# Patient Record
Sex: MALE | Race: WHITE | Employment: FULL TIME | URBAN - METROPOLITAN AREA
[De-identification: names, ages, dates, MRNs, and addresses within clinical notes are randomized per-mention and may not be internally consistent; named-entity substitution may affect disease eponyms.]

---

## 2023-10-03 ENCOUNTER — OFFICE VISIT (OUTPATIENT)
Dept: ORTHOPEDIC SURGERY | Age: 68
End: 2023-10-03
Payer: MEDICARE

## 2023-10-03 VITALS — HEIGHT: 70 IN | BODY MASS INDEX: 25.77 KG/M2 | WEIGHT: 180 LBS

## 2023-10-03 DIAGNOSIS — M79.604 RIGHT LEG PAIN: ICD-10-CM

## 2023-10-03 DIAGNOSIS — M54.9 BACK PAIN, UNSPECIFIED BACK LOCATION, UNSPECIFIED BACK PAIN LATERALITY, UNSPECIFIED CHRONICITY: Primary | ICD-10-CM

## 2023-10-03 DIAGNOSIS — T84.010A FAILURE OF RIGHT TOTAL HIP ARTHROPLASTY, INITIAL ENCOUNTER (HCC): ICD-10-CM

## 2023-10-03 DIAGNOSIS — M25.551 ACUTE PAIN OF RIGHT HIP: Primary | ICD-10-CM

## 2023-10-03 DIAGNOSIS — Z96.641 HISTORY OF TOTAL HIP ARTHROPLASTY, RIGHT: ICD-10-CM

## 2023-10-03 DIAGNOSIS — M54.9 BACK PAIN, UNSPECIFIED BACK LOCATION, UNSPECIFIED BACK PAIN LATERALITY, UNSPECIFIED CHRONICITY: ICD-10-CM

## 2023-10-03 LAB
BASOPHILS # BLD: 0.1 K/UL (ref 0–0.2)
BASOPHILS NFR BLD: 1 % (ref 0–2)
CRP SERPL-MCNC: <0.3 MG/DL (ref 0–0.9)
DIFFERENTIAL METHOD BLD: ABNORMAL
EOSINOPHIL # BLD: 0.2 K/UL (ref 0–0.8)
EOSINOPHIL NFR BLD: 3 % (ref 0.5–7.8)
ERYTHROCYTE [DISTWIDTH] IN BLOOD BY AUTOMATED COUNT: 13.7 % (ref 11.9–14.6)
ERYTHROCYTE [SEDIMENTATION RATE] IN BLOOD: <1 MM/HR
HCT VFR BLD AUTO: 40.4 % (ref 41.1–50.3)
HGB BLD-MCNC: 13.6 G/DL (ref 13.6–17.2)
IMM GRANULOCYTES # BLD AUTO: 0 K/UL (ref 0–0.5)
IMM GRANULOCYTES NFR BLD AUTO: 0 % (ref 0–5)
LYMPHOCYTES # BLD: 1.4 K/UL (ref 0.5–4.6)
LYMPHOCYTES NFR BLD: 18 % (ref 13–44)
MCH RBC QN AUTO: 32.2 PG (ref 26.1–32.9)
MCHC RBC AUTO-ENTMCNC: 33.7 G/DL (ref 31.4–35)
MCV RBC AUTO: 95.5 FL (ref 82–102)
MONOCYTES # BLD: 0.6 K/UL (ref 0.1–1.3)
MONOCYTES NFR BLD: 8 % (ref 4–12)
NEUTS SEG # BLD: 5.6 K/UL (ref 1.7–8.2)
NEUTS SEG NFR BLD: 70 % (ref 43–78)
NRBC # BLD: 0 K/UL (ref 0–0.2)
PLATELET # BLD AUTO: 222 K/UL (ref 150–450)
PMV BLD AUTO: 11.1 FL (ref 9.4–12.3)
RBC # BLD AUTO: 4.23 M/UL (ref 4.23–5.6)
WBC # BLD AUTO: 7.9 K/UL (ref 4.3–11.1)

## 2023-10-03 PROCEDURE — G8484 FLU IMMUNIZE NO ADMIN: HCPCS | Performed by: ORTHOPAEDIC SURGERY

## 2023-10-03 PROCEDURE — 3017F COLORECTAL CA SCREEN DOC REV: CPT | Performed by: ORTHOPAEDIC SURGERY

## 2023-10-03 PROCEDURE — 1036F TOBACCO NON-USER: CPT | Performed by: ORTHOPAEDIC SURGERY

## 2023-10-03 PROCEDURE — G8419 CALC BMI OUT NRM PARAM NOF/U: HCPCS | Performed by: ORTHOPAEDIC SURGERY

## 2023-10-03 PROCEDURE — G8428 CUR MEDS NOT DOCUMENT: HCPCS | Performed by: ORTHOPAEDIC SURGERY

## 2023-10-03 PROCEDURE — 99204 OFFICE O/P NEW MOD 45 MIN: CPT | Performed by: ORTHOPAEDIC SURGERY

## 2023-10-03 PROCEDURE — 1123F ACP DISCUSS/DSCN MKR DOCD: CPT | Performed by: ORTHOPAEDIC SURGERY

## 2023-10-03 RX ORDER — ZALEPLON 10 MG/1
10 CAPSULE ORAL
COMMUNITY
Start: 2023-08-14

## 2023-10-03 RX ORDER — MESALAMINE 4 G/60 ML
KIT RECTAL
COMMUNITY
Start: 2023-09-12

## 2023-10-03 RX ORDER — TADALAFIL 20 MG/1
20 TABLET ORAL PRN
COMMUNITY
Start: 2023-07-17

## 2023-10-03 RX ORDER — SODIUM, POTASSIUM,MAG SULFATES 17.5-3.13G
SOLUTION, RECONSTITUTED, ORAL ORAL
COMMUNITY
Start: 2023-09-07

## 2023-10-03 RX ORDER — FINASTERIDE 5 MG/1
5 TABLET, FILM COATED ORAL DAILY
COMMUNITY
Start: 2023-01-11

## 2023-10-03 RX ORDER — PYRIDOXINE HCL (VITAMIN B6) 100 MG
100 TABLET ORAL EVERY MORNING
COMMUNITY

## 2023-10-03 RX ORDER — BUDESONIDE 3 MG/1
CAPSULE, COATED PELLETS ORAL
COMMUNITY
Start: 2023-09-20 | End: 2023-11-15

## 2023-10-03 RX ORDER — ATORVASTATIN CALCIUM 10 MG/1
10 TABLET, FILM COATED ORAL
COMMUNITY
Start: 2022-11-01

## 2023-10-03 RX ORDER — PANTOPRAZOLE SODIUM 40 MG/1
40 TABLET, DELAYED RELEASE ORAL DAILY
COMMUNITY
Start: 2022-11-16

## 2023-10-03 RX ORDER — CELECOXIB 200 MG/1
200 CAPSULE ORAL DAILY
COMMUNITY
Start: 2022-11-28

## 2023-10-03 RX ORDER — HYDROCHLOROTHIAZIDE 12.5 MG/1
12.5 CAPSULE, GELATIN COATED ORAL EVERY OTHER DAY
COMMUNITY
Start: 2023-08-23

## 2023-10-03 NOTE — PROGRESS NOTES
Name: Mackenzie Earl  YOB: 1955  Gender: male  MRN: [id number]    CC:   Chief Complaint   Patient presents with    Follow-up     Rt rosalio revision hip and groin pain- Dr. Ligia Meade 2020         HPI: Mackenzie Earl is a 69 y.o. male with a PMHx of GERD, HLD, s/p right ROSALIO revision in 2020 with Dr. Ligia Meade, and history of lumbar surgery with Dr. Traci Lewis in 2013 here for evaluation of right hip pain  There was not an acute injury  Regarding the hip pain, it has been present for 2 years and is becoming worse. The pain is primarily lateral, over greater trochanter, anterior into the groin, and posterior buttock area  he describes the pain as dull, aching, throbbing, intermittent catching, and and often feels unstable  The pain is worse with going up and/or down stairs, rising after sitting, and walking  The pain does not radiate down the leg. he denies numbness and tingling down the leg. Treatment so far has been activity modification, Tylenol, and celebrex with little relief. Review of Systems  As per HPI. Pertinent positives and negatives are addressed with the patient, particularly those related to musculoskeletal concerns. Non-orthopaedic concerns were referred back to the primary care physician. Not on File                 PHYSICAL EXAMINATION:   The patient is alert and oriented, in no distress. There were no vitals filed for this visit. The cervical, thoracic and lumbar spine are without compromising deformity. The upper extremities demonstrate reasonable tone, strength and function bilaterally. The lower extremities are as described below. Circulation is normal with palpable pedal pulses bilaterally and no edema. Skin of the leg is normal with no chronic stasis disease bilaterally. Sensation is intact to light touch bilaterally.   The gait is noted to be with a slight trendelenburg and antalgia  There is mild tenderness to palpation along the spinous processes

## 2023-10-09 ENCOUNTER — TELEPHONE (OUTPATIENT)
Dept: ORTHOPEDIC SURGERY | Age: 68
End: 2023-10-09

## 2023-10-09 NOTE — TELEPHONE ENCOUNTER
Spoke with patient, will ask dr. Marek Jain if mri for hip is ok to schedule & update the patient with information

## 2023-10-11 DIAGNOSIS — M25.551 ACUTE PAIN OF RIGHT HIP: Primary | ICD-10-CM

## 2023-10-11 DIAGNOSIS — T84.010A FAILURE OF RIGHT TOTAL HIP ARTHROPLASTY, INITIAL ENCOUNTER (HCC): ICD-10-CM

## 2023-10-11 DIAGNOSIS — Z96.641 HISTORY OF TOTAL HIP ARTHROPLASTY, RIGHT: ICD-10-CM

## 2023-10-13 DIAGNOSIS — T84.010A FAILURE OF RIGHT TOTAL HIP ARTHROPLASTY, INITIAL ENCOUNTER (HCC): ICD-10-CM

## 2023-10-13 DIAGNOSIS — M25.551 ACUTE PAIN OF RIGHT HIP: Primary | ICD-10-CM

## 2023-10-13 DIAGNOSIS — Z96.641 HISTORY OF TOTAL HIP ARTHROPLASTY, RIGHT: ICD-10-CM

## 2023-10-13 DIAGNOSIS — Z96.641 HX OF TOTAL HIP ARTHROPLASTY, RIGHT: ICD-10-CM

## 2023-10-17 ENCOUNTER — OFFICE VISIT (OUTPATIENT)
Dept: ORTHOPEDIC SURGERY | Age: 68
End: 2023-10-17

## 2023-10-17 DIAGNOSIS — M25.551 ACUTE PAIN OF RIGHT HIP: Primary | ICD-10-CM

## 2023-10-17 NOTE — PROGRESS NOTES
[facility]  Pre Operative History and Physical Exam    Patient ID:  Miguel Huerta  [id number]  89 y.o.  1955    Today: October 17, 2023           CC:  Right hip pain    HPI:   The patient has a failed SUNNY right hip. The patient was evaluated and examined during a consultation prior to this office visit. There have been no changes to the patient's orthopedic condition since the initial consultation. The patient has failed previous conservative treatment for this condition including antiinflammatories , and lifestyle modifications. The necessity for joint replacement is present. The patient will be admitted the day of surgery for right hip replacement    Past Medical/Surgical History:No past medical history on file. No past surgical history on file.      Allergies: No Known Allergies     Physical Exam:   General: NAD, Alert, Oriented, Appears their stated age     HEENT: NC/AT    Skin: No rashes, lesions or wounds seen      Psych: normal affect      Heart: Regular Rate, Rhythm     Lungs: unlabored respirations, no wheezing    Abdomen: Soft and non-distended     Ortho: Pain with limited ROM of the right hip    Neuro: no focal defects, moving extremities equally    Lymph: no lymphadenopathy     Meds:   Current Outpatient Medications   Medication Sig    atorvastatin (LIPITOR) 10 MG tablet Take 1 tablet by mouth    budesonide (ENTOCORT EC) 3 MG delayed release capsule Take by mouth    celecoxib (CELEBREX) 200 MG capsule Take 1 capsule by mouth daily    diclofenac (VOLTAREN) 50 MG EC tablet TAKE 1 TABLET (50 MG) BY MOUTH 2 (TWO) TIMES A DAY    finasteride (PROSCAR) 5 MG tablet Take 1 tablet by mouth daily    hydroCHLOROthiazide (MICROZIDE) 12.5 MG capsule Take 1 capsule by mouth every other day    Mesalamine-Cleanser 4 g KIT INSERT 60 ML INTO THE RECTUM DAILY    sodium-potassium-mag sulfate (SUPREP) 17.5-3.13-1.6 GM/177ML SOLN solution As directed for colon prep    pantoprazole (PROTONIX)
into each neural foramen, contributing  to severe bilateral neural foraminal narrowing. L5-S1: Bilateral facet arthropathy. Shallow circumferential disc bulge/posterior  disc osteophyte complex. No spinal canal stenosis. Disc osteophyte complex  extends into each neural foramen, contributing to moderate left and no  significant right neural foraminal narrowing. IMPRESSION:     1.  Multilevel lumbar spondylosis, as detailed above. 2.  At L1-L2, there is moderate to severe left neural foraminal narrowing. 3.  At L2-L3, there is mild spinal canal stenosis with mild bilateral neural  foraminal narrowing. 4.  At L3-L4, there is mild spinal canal stenosis with severe right and  mild-to-moderate left neural foraminal narrowing. 5. At L4-L5, there is mild spinal canal stenosis with severe bilateral neural  foraminal narrowing. 6.  At L5-S1, there is moderate left neural foraminal narrowing. IMPRESSION:      ICD-10-CM    1. Acute pain of right hip  M25.551               RECOMMENDATION:    X-rays were reviewed with the patient today. The patient is complaining of pain and discomfort in his right buttock and right hip area. It is made worse with standing walking. Some of his symptoms sound as if they could be related to spinal stenosis. Although he has symptoms which are probably related to the right hip. 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 He had a constrained liner placed and it appears that he has failed the constrained liner with dissociation of the locking ring. I am also somewhat concerned about the possibility of plastic failure within this right hip that could result in metal or metal wear. I have recommended that he have a CBC and sed rate to rule out infection. I recommended an MRI scan of the lumbar spine to evaluate for spinal stenosis. I have recommended revision of his right total hip arthroplasty.   I have

## 2023-10-25 DIAGNOSIS — Z96.641 STATUS POST RIGHT HIP REPLACEMENT: Primary | ICD-10-CM

## 2023-10-27 ENCOUNTER — PREP FOR PROCEDURE (OUTPATIENT)
Dept: ORTHOPEDIC SURGERY | Age: 68
End: 2023-10-27

## 2023-10-27 DIAGNOSIS — T84.010A FAILURE OF RIGHT TOTAL HIP ARTHROPLASTY, INITIAL ENCOUNTER (HCC): Primary | ICD-10-CM

## 2023-10-27 RX ORDER — SODIUM CHLORIDE 9 MG/ML
INJECTION, SOLUTION INTRAVENOUS PRN
Status: CANCELLED | OUTPATIENT
Start: 2023-10-27

## 2023-10-27 RX ORDER — ACETAMINOPHEN 325 MG/1
1000 TABLET ORAL ONCE
Status: CANCELLED | OUTPATIENT
Start: 2023-10-27 | End: 2023-10-27

## 2023-10-27 RX ORDER — SODIUM CHLORIDE 0.9 % (FLUSH) 0.9 %
5-40 SYRINGE (ML) INJECTION EVERY 12 HOURS SCHEDULED
Status: CANCELLED | OUTPATIENT
Start: 2023-10-27

## 2023-10-27 RX ORDER — SODIUM CHLORIDE 0.9 % (FLUSH) 0.9 %
5-40 SYRINGE (ML) INJECTION PRN
Status: CANCELLED | OUTPATIENT
Start: 2023-10-27

## 2023-10-30 ENCOUNTER — HOSPITAL ENCOUNTER (OUTPATIENT)
Dept: SURGERY | Age: 68
Discharge: HOME | End: 2023-11-02
Payer: MEDICARE

## 2023-10-30 ENCOUNTER — HOSPITAL ENCOUNTER (OUTPATIENT)
Dept: REHABILITATION | Age: 68
Discharge: HOME | End: 2023-11-02
Payer: MEDICARE

## 2023-10-30 VITALS
RESPIRATION RATE: 18 BRPM | SYSTOLIC BLOOD PRESSURE: 138 MMHG | DIASTOLIC BLOOD PRESSURE: 71 MMHG | HEART RATE: 58 BPM | TEMPERATURE: 98.2 F | OXYGEN SATURATION: 94 % | WEIGHT: 187.39 LBS | BODY MASS INDEX: 26.83 KG/M2 | HEIGHT: 70 IN

## 2023-10-30 DIAGNOSIS — T84.010A FAILURE OF RIGHT TOTAL HIP ARTHROPLASTY, INITIAL ENCOUNTER (HCC): ICD-10-CM

## 2023-10-30 LAB
ANION GAP SERPL CALC-SCNC: 5 MMOL/L (ref 2–11)
APTT PPP: 26.2 SEC (ref 24.5–34.2)
BASOPHILS # BLD: 0 K/UL (ref 0–0.2)
BASOPHILS NFR BLD: 1 % (ref 0–2)
BUN SERPL-MCNC: 23 MG/DL (ref 8–23)
CALCIUM SERPL-MCNC: 9.3 MG/DL (ref 8.3–10.4)
CHLORIDE SERPL-SCNC: 108 MMOL/L (ref 101–110)
CO2 SERPL-SCNC: 27 MMOL/L (ref 21–32)
CREAT SERPL-MCNC: 1.01 MG/DL (ref 0.8–1.5)
DIFFERENTIAL METHOD BLD: ABNORMAL
EKG ATRIAL RATE: 54 BPM
EKG DIAGNOSIS: NORMAL
EKG P AXIS: 75 DEGREES
EKG P-R INTERVAL: 174 MS
EKG Q-T INTERVAL: 410 MS
EKG QRS DURATION: 94 MS
EKG QTC CALCULATION (BAZETT): 388 MS
EKG R AXIS: 72 DEGREES
EKG T AXIS: 33 DEGREES
EKG VENTRICULAR RATE: 54 BPM
EOSINOPHIL # BLD: 0.2 K/UL (ref 0–0.8)
EOSINOPHIL NFR BLD: 4 % (ref 0.5–7.8)
ERYTHROCYTE [DISTWIDTH] IN BLOOD BY AUTOMATED COUNT: 13.2 % (ref 11.9–14.6)
EST. AVERAGE GLUCOSE BLD GHB EST-MCNC: 114 MG/DL
GLUCOSE SERPL-MCNC: 165 MG/DL (ref 65–100)
HBA1C MFR BLD: 5.6 % (ref 4.8–5.6)
HCT VFR BLD AUTO: 39.4 % (ref 41.1–50.3)
HGB BLD-MCNC: 13.3 G/DL (ref 13.6–17.2)
IMM GRANULOCYTES # BLD AUTO: 0 K/UL (ref 0–0.5)
IMM GRANULOCYTES NFR BLD AUTO: 0 % (ref 0–5)
INR PPP: 1
LYMPHOCYTES # BLD: 1.5 K/UL (ref 0.5–4.6)
LYMPHOCYTES NFR BLD: 25 % (ref 13–44)
MCH RBC QN AUTO: 31.7 PG (ref 26.1–32.9)
MCHC RBC AUTO-ENTMCNC: 33.8 G/DL (ref 31.4–35)
MCV RBC AUTO: 93.8 FL (ref 82–102)
MONOCYTES # BLD: 0.4 K/UL (ref 0.1–1.3)
MONOCYTES NFR BLD: 7 % (ref 4–12)
MRSA DNA SPEC QL NAA+PROBE: NOT DETECTED
NEUTS SEG # BLD: 4 K/UL (ref 1.7–8.2)
NEUTS SEG NFR BLD: 64 % (ref 43–78)
NRBC # BLD: 0 K/UL (ref 0–0.2)
PLATELET # BLD AUTO: 201 K/UL (ref 150–450)
PMV BLD AUTO: 10.9 FL (ref 9.4–12.3)
POTASSIUM SERPL-SCNC: 4.1 MMOL/L (ref 3.5–5.1)
PROTHROMBIN TIME: 13.4 SEC (ref 12.6–14.3)
RBC # BLD AUTO: 4.2 M/UL (ref 4.23–5.6)
S AUREUS CPE NOSE QL NAA+PROBE: DETECTED
SODIUM SERPL-SCNC: 140 MMOL/L (ref 133–143)
WBC # BLD AUTO: 6.2 K/UL (ref 4.3–11.1)

## 2023-10-30 PROCEDURE — 80048 BASIC METABOLIC PNL TOTAL CA: CPT

## 2023-10-30 PROCEDURE — 93005 ELECTROCARDIOGRAM TRACING: CPT | Performed by: ANESTHESIOLOGY

## 2023-10-30 PROCEDURE — 87641 MR-STAPH DNA AMP PROBE: CPT

## 2023-10-30 PROCEDURE — 94760 N-INVAS EAR/PLS OXIMETRY 1: CPT

## 2023-10-30 PROCEDURE — 85730 THROMBOPLASTIN TIME PARTIAL: CPT

## 2023-10-30 PROCEDURE — 93010 ELECTROCARDIOGRAM REPORT: CPT | Performed by: INTERNAL MEDICINE

## 2023-10-30 PROCEDURE — 85025 COMPLETE CBC W/AUTO DIFF WBC: CPT

## 2023-10-30 PROCEDURE — 83036 HEMOGLOBIN GLYCOSYLATED A1C: CPT

## 2023-10-30 PROCEDURE — 97161 PT EVAL LOW COMPLEX 20 MIN: CPT

## 2023-10-30 PROCEDURE — 85610 PROTHROMBIN TIME: CPT

## 2023-10-30 RX ORDER — NAPROXEN 500 MG/1
500 TABLET ORAL 2 TIMES DAILY WITH MEALS
COMMUNITY

## 2023-10-30 RX ORDER — ALBUTEROL SULFATE 2.5 MG/3ML
2.5 SOLUTION RESPIRATORY (INHALATION) EVERY 6 HOURS PRN
Status: CANCELLED | OUTPATIENT
Start: 2023-10-30

## 2023-10-30 ASSESSMENT — HOOS JR
SITTING: 1
BENDING TO THE FLOOR TO PICK UP OBJECT: 1
HOOS JR RAW SCORE: 5
GOING UP OR DOWN STAIRS: 1
HOOS JR RAW SCORE: 5
WALKING ON UNEVEN SURFACE: 1
HOOS JR TOTAL INTERVAL SCORE: 73.472
RISING FROM SITTING: 0
LYING IN BED (TURNING OVER, MAINTAINING HIP POSITION): 1

## 2023-10-30 ASSESSMENT — PAIN DESCRIPTION - DESCRIPTORS
DESCRIPTORS: SHARP;SHOOTING
DESCRIPTORS: ACHING

## 2023-10-30 ASSESSMENT — PROMIS GLOBAL HEALTH SCALE
IN THE PAST 7 DAYS, HOW OFTEN HAVE YOU BEEN BOTHERED BY EMOTIONAL PROBLEMS, SUCH AS FEELING ANXIOUS, DEPRESSED, OR IRRITABLE [ON A SCALE FROM 1 (NEVER) TO 5 (ALWAYS)]?: 5
SUM OF RESPONSES TO QUESTIONS 2, 4, 5, & 10: 20
HOW IS THE PROMIS V1.1 BEING ADMINISTERED?: 0
IN GENERAL, PLEASE RATE HOW WELL YOU CARRY OUT YOUR USUAL SOCIAL ACTIVITIES (INCLUDES ACTIVITIES AT HOME, AT WORK, AND IN YOUR COMMUNITY, AND RESPONSIBILITIES AS A PARENT, CHILD, SPOUSE, EMPLOYEE, FRIEND, ETC) [ON A SCALE OF 1 (POOR) TO 5 (EXCELLENT)]?: 5
IN THE PAST 7 DAYS, HOW WOULD YOU RATE YOUR PAIN ON AVERAGE [ON A SCALE FROM 0 (NO PAIN) TO 10 (WORST IMAGINABLE PAIN)]?: 2
IN GENERAL, HOW WOULD YOU RATE YOUR SATISFACTION WITH YOUR SOCIAL ACTIVITIES AND RELATIONSHIPS [ON A SCALE OF 1 (POOR) TO 5 (EXCELLENT)]?: 5
IN THE PAST 7 DAYS, HOW WOULD YOU RATE YOUR FATIGUE ON AVERAGE [ON A SCALE FROM 1 (NONE) TO 5 (VERY SEVERE)]?: 4
TO WHAT EXTENT ARE YOU ABLE TO CARRY OUT YOUR EVERYDAY PHYSICAL ACTIVITIES SUCH AS WALKING, CLIMBING STAIRS, CARRYING GROCERIES, OR MOVING A CHAIR [ON A SCALE OF 1 (NOT AT ALL) TO 5 (COMPLETELY)]?: 3
IN GENERAL, WOULD YOU SAY YOUR QUALITY OF LIFE IS...[ON A SCALE OF 1 (POOR) TO 5 (EXCELLENT)]: 5
SUM OF RESPONSES TO QUESTIONS 3, 6, 7, & 8: 14
IN GENERAL, HOW WOULD YOU RATE YOUR MENTAL HEALTH, INCLUDING YOUR MOOD AND YOUR ABILITY TO THINK [ON A SCALE OF 1 (POOR) TO 5 (EXCELLENT)]?: 5
WHO IS THE PERSON COMPLETING THE PROMIS V1.1 SURVEY?: 0
IN GENERAL, HOW WOULD YOU RATE YOUR PHYSICAL HEALTH [ON A SCALE OF 1 (POOR) TO 5 (EXCELLENT)]?: 5
IN GENERAL, WOULD YOU SAY YOUR HEALTH IS...[ON A SCALE OF 1 (POOR) TO 5 (EXCELLENT)]: 5

## 2023-10-30 ASSESSMENT — PULMONARY FUNCTION TESTS
FEV1 (LITERS): 3.07
FEV1 (%PREDICTED): 98

## 2023-10-30 ASSESSMENT — PAIN SCALES - GENERAL: PAINLEVEL_OUTOF10: 3

## 2023-10-30 ASSESSMENT — PAIN DESCRIPTION - FREQUENCY: FREQUENCY: CONTINUOUS

## 2023-10-30 ASSESSMENT — PAIN DESCRIPTION - ORIENTATION: ORIENTATION: RIGHT

## 2023-10-30 ASSESSMENT — PAIN - FUNCTIONAL ASSESSMENT: PAIN_FUNCTIONAL_ASSESSMENT: PREVENTS OR INTERFERES SOME ACTIVE ACTIVITIES AND ADLS

## 2023-10-30 ASSESSMENT — PAIN DESCRIPTION - LOCATION
LOCATION: HIP
LOCATION: HIP

## 2023-10-30 NOTE — PERIOP NOTE
Lab results within anesthesia guidelines, no follow-up required. Labs automatically routed to ordering provider via Epic documentation.    Latest Reference Range & Units 10/30/23 07:56   Sodium 133 - 143 mmol/L 140   Potassium 3.5 - 5.1 mmol/L 4.1   Chloride 101 - 110 mmol/L 108   CO2 21 - 32 mmol/L 27   BUN,BUNPL 8 - 23 MG/DL 23   Creatinine 0.8 - 1.5 MG/DL 1.01   Anion Gap 2 - 11 mmol/L 5   Est, Glom Filt Rate >60 ml/min/1.73m2 >60   Glucose, Random 65 - 100 mg/dL 165 (H)   CALCIUM, SERUM, 960646 8.3 - 10.4 MG/DL 9.3   Hemoglobin A1C 4.8 - 5.6 % 5.6   eAG (mg/dL) mg/dL 114   WBC 4.3 - 11.1 K/uL 6.2   RBC 4.23 - 5.6 M/uL 4.20 (L)   Hemoglobin Quant 13.6 - 17.2 g/dL 13.3 (L)   Hematocrit 41.1 - 50.3 % 39.4 (L)   MCV 82.0 - 102.0 FL 93.8   MCH 26.1 - 32.9 PG 31.7   MCHC 31.4 - 35.0 g/dL 33.8   MPV 9.4 - 12.3 FL 10.9   RDW 11.9 - 14.6 % 13.2   Platelet Count 799 - 450 K/uL 201   Neutrophils % 43 - 78 % 64   Lymphocyte % 13 - 44 % 25   Monocytes % 4.0 - 12.0 % 7   Eosinophils % 0.5 - 7.8 % 4   Basophils % 0.0 - 2.0 % 1   Neutrophils Absolute 1.7 - 8.2 K/UL 4.0   Lymphocytes Absolute 0.5 - 4.6 K/UL 1.5   Monocytes Absolute 0.1 - 1.3 K/UL 0.4   Eosinophils Absolute 0.0 - 0.8 K/UL 0.2   Basophils Absolute 0.0 - 0.2 K/UL 0.0   Differential Type -   AUTOMATED   Immature Granulocytes 0.0 - 5.0 % 0   Nucleated Red Blood Cells 0.0 - 0.2 K/uL 0.00   Absolute Immature Granulocyte 0.0 - 0.5 K/UL 0.0   Prothrombin Time 12.6 - 14.3 sec 13.4   INR -   1.0   PTT 24.5 - 34.2 SEC 26.2   MRSA/STAPH AUREUS DNA  Rpt   (H): Data is abnormally high  (L): Data is abnormally low  Rpt: View report in Results Review for more information

## 2023-10-30 NOTE — PROGRESS NOTES
10/30/23 0715   Treatment   Treatment Type Bedside spirometry   Oxygen Therapy/Pulse Ox   O2 Therapy Room air   Pulse 54   SpO2 96 %   Pulse Oximeter Device Mode Intermittent   $Pulse Oximeter $Spot check (single)   Bedside Spirometry   FEV-1/Actual (Liters) 3.07 Liters   FEV-1/Predicted (Liters) 98 Liters     Initial respiratory Assessment completed with pt. Pt was interviewed and evaluated in Joint camp prior to surgery. Patient ID:  Kiana Balderas  [id number]  66 y.o.  1955  Surgeon: Dr. Marly Valentine  Date of Surgery: [email or web address]  Procedure: Total Right Hip Arthroplasty  Primary Care Physician: Adalberto Acuna -[phone]  Specialists:     BS:clear      Pt taught proper COUGH technique  IS REVIEWED WITH PT AS WELL AS BENEFITS OF USING IS IN SEDENTARY PTS.   DIAPHRAGMATIC BREATHING EXERCISE INSTRUCTIONS GIVEN    History of smoking:   DENIES                 Quit date:         Secondhand smoke:parents    Past procedures with Oxygen desaturation or delayed awakening:DENIES    Past Medical History:   Diagnosis Date    Arthritis     Benign essential hypertension     Collagenous colitis 09/2023    found in colon biopsy, treated with Budesonide    DDD (degenerative disc disease), lumbar     Diabetes mellitus ([address])     cntrolled with diet    DVT (deep venous thrombosis) ([address]) 02/14/2014    left leg after hamstring repair    GERD (gastroesophageal reflux disease)     Nuclear sclerotic cataract, bilateral     PVC (premature ventricular contraction) 10/30/2023    noted on EKG    Reflux gastritis     managed with medication    HX OF MILD COVID  RADHA- NO CPAP    Respiratory history:DENIES SOB                                                                  Respiratory meds:  DENIES    FAMILY PRESENT:             NO     PAST SLEEP STUDY:        YES                     HX OF RADHA:                        YES                     RADHA assessment:     DANGERS OF UNTREATED RADHA EXPLAINED TO PT.
10/30/23 0715   Treatment   Treatment Type Bedside spirometry   Oxygen Therapy/Pulse Ox   O2 Therapy Room air   Pulse 54   SpO2 96 %   Pulse Oximeter Device Mode Intermittent   $Pulse Oximeter $Spot check (single)   Bedside Spirometry   FEV-1/Actual (Liters) 3.07 Liters   FEV-1/Predicted (Liters) 98 Liters     Initial respiratory Assessment completed with pt. Pt was interviewed and evaluated in Joint camp prior to surgery. Patient ID:  Nick De La Cruz  [id number]  77 y.o.  1955  Surgeon: {[facility] ORTHO SURGEONS - GGL:[id number]}  Date of Surgery: [unfilled]  Procedure: Total {COLLIN ORTHO EXTREMITY:[id number]} Arthroplasty  Primary Care Physician: Cullen Taavres [phone]  Specialists:     BS:      Pt taught proper COUGH technique  IS REVIEWED WITH PT AS WELL AS BENEFITS OF USING IS IN SEDENTARY PTS.   DIAPHRAGMATIC BREATHING EXERCISE INSTRUCTIONS GIVEN    History of smoking:   DENIES                 Quit date:         Secondhand smoke:DENIES    Past procedures with Oxygen desaturation or delayed awakening:DENIES    Past Medical History:   Diagnosis Date    Arthritis     Benign essential hypertension     Collagenous colitis 09/2023    found in colon biopsy, treated with Budesonide    DDD (degenerative disc disease), lumbar     Diabetes mellitus ([address])     cntrolled with diet    DVT (deep venous thrombosis) ([address]) 02/14/2014    left leg after hamstring repair    GERD (gastroesophageal reflux disease)     Nuclear sclerotic cataract, bilateral     PVC (premature ventricular contraction) 10/30/2023    noted on EKG    Reflux gastritis     managed with medication        Respiratory history:DENIES SOB                                 SOB  ON EXERTION                                    Respiratory meds:  DENIES    FAMILY PRESENT:             NO     PAST SLEEP STUDY:        YES                   DENIES  HX OF RADHA:                        YES                    DENIES  RADHA assessment:     DANGERS OF UNTREATED
Encounter Date: 10/30/23   EKG 12 Lead   Result Value    Ventricular Rate 54    Atrial Rate 54    P-R Interval 174    QRS Duration 94    Q-T Interval 410    QTc Calculation (Bazett) 388    P Axis 75    R Axis 72    T Axis 33    Diagnosis      Sinus bradycardia with occasional Premature ventricular complexes  Otherwise normal ECG  No previous ECGs available  Confirmed by ST MARIA PATTERSON MD ()NOEL ([id number]) on 10/30/2023 9:08:53 AM         Data Review:   Labs:   Recent Results (from the past 24 hour(s))   EKG 12 Lead    Collection Time: 10/30/23  7:31 AM   Result Value Ref Range    Ventricular Rate 54 BPM    Atrial Rate 54 BPM    P-R Interval 174 ms    QRS Duration 94 ms    Q-T Interval 410 ms    QTc Calculation (Bazett) 388 ms    P Axis 75 degrees    R Axis 72 degrees    T Axis 33 degrees    Diagnosis       Sinus bradycardia with occasional Premature ventricular complexes  Otherwise normal ECG  No previous ECGs available  Confirmed by ST MARIA PATTERSON MD ()NOEL ([id number]) on 10/30/2023 9:08:53 AM     Protime-INR    Collection Time: 10/30/23  7:56 AM   Result Value Ref Range    Protime 13.4 12.6 - 14.3 sec    INR 1.0     Hemoglobin A1c    Collection Time: 10/30/23  7:56 AM   Result Value Ref Range    Hemoglobin A1C 5.6 4.8 - 5.6 %    eAG 114 mg/dL   CBC with Auto Differential    Collection Time: 10/30/23  7:56 AM   Result Value Ref Range    WBC 6.2 4.3 - 11.1 K/uL    RBC 4.20 (L) 4.23 - 5.6 M/uL    Hemoglobin 13.3 (L) 13.6 - 17.2 g/dL    Hematocrit 39.4 (L) 41.1 - 50.3 %    MCV 93.8 82.0 - 102.0 FL    MCH 31.7 26.1 - 32.9 PG    MCHC 33.8 31.4 - 35.0 g/dL    RDW 13.2 11.9 - 14.6 %    Platelets 904 493 - 335 K/uL    MPV 10.9 9.4 - 12.3 FL    nRBC 0.00 0.0 - 0.2 K/uL    Differential Type AUTOMATED      Neutrophils % 64 43 - 78 %    Lymphocytes % 25 13 - 44 %    Monocytes % 7 4.0 - 12.0 %    Eosinophils % 4 0.5 - 7.8 %    Basophils % 1 0.0 - 2.0 %    Immature Granulocytes 0 0.0 - 5.0 %    Neutrophils Absolute 4.0 1.7 -

## 2023-10-30 NOTE — PERIOP NOTE
Patient verified name and . Order for consent was found in EHR and matches case posting; patient verified.   : Revision Right total hip arthroplasty    Type 3 surgery, PAT joint camp assessment complete. Labs per surgeon: CBC,BMP, PT/PTT, HgbA1c ; results pending  Labs per anesthesia protocol: no additional  EKG: Done today and within anesthesia guidelines    Latest Exercise stress test 10/24/2018 and latest ECHO with LVEF 55-65% 10/24/2018 and found in [facility] for anesthesia reference. MRSA/MSSA swab collected; pharmacy to review and dose antibiotic as appropriate. Hospital approved surgical skin cleanser and instructions to return bottle on DOS given per hospital policy. Patient provided with handouts including Guide to Surgery, Pain Management, Hand Hygiene, Blood Transfusion Education, and [facility] Brochure. Patient answered medical/surgical history questions at their best of ability. All prior to admission medications documented in [facility]. Original medication prescription bottle not visualized during patient appointment. Patient instructed to hold all vitamins 3 weeks prior to surgery and NSAIDS 5 days prior to surgery. Patient teach back successful and patient demonstrates knowledge of instruction.

## 2023-10-30 NOTE — PROGRESS NOTES
Tereso Reasons  : 1955  Primary: Medicare Part [place] B  Secondary: [id number] [address], [address]  [phone]      Physical Therapy Prehab Evaluation Summary:10/30/2023   Time In/Out   PT Charge Capture  Episode     MEDICAL/REFERRING DIAGNOSIS: Broken internal right hip prosthesis, initial encounter [T84.010A]  REFERRING PHYSICIAN: Paras Bennett, *    Treatment Diagnosis:   Pain in Right Hip (M25.551)  Stiffness of Right Hip, Not elsewhere classified (M25.651)  Difficulty in walking, Not elsewhere classified (R26.2)    DATE OF SURGERY: 23  Assessment:   COMMENTS:  Mr. Zheng Hernadez is present for a Prehab Physical Therapy Assessment for their upcoming right SUNNY Revision. They are here alone. After discussing the surgical admission options and discharge plans, they are planning on discharging after one night in the hospital.    Pt. Plans to go home with support from his wife. This is his 3rd sunny on the right. He does not want HHPT, wants to go straight to OPPT. PROBLEM LIST:   (Impacting functional limitations):  Mr. Zheng Hernadez presents with the following lower extremity(s) problems:  Strength  Range of Motion  Home Exercise Program  Pain INTERVENTIONS PLANNED:   (Benefits and precautions of physical therapy have been discussed with the patient.)  Home Exercise Program  Educational Discussion       GOALS: (Goals have been discussed and agreed upon with patient.)  Discharge Goals: Time Frame: 1 Day  Patient will demonstrate independence with a home exercise program designed to increase strength, range of motion, and pain control to minimize functional deficits and optimize patient for total joint replacement.     Subjective:   Past Medical History/Comorbidities:   Mr. Zheng Hernadez  has a past medical history of Arthritis, Benign essential hypertension, Collagenous colitis, DDD (degenerative disc disease), lumbar, Diabetes

## 2023-10-30 NOTE — PERIOP NOTE
PLEASE CONTINUE TAKING ALL PRESCRIPTION MEDICATIONS UP TO THE DAY OF SURGERY UNLESS OTHERWISE DIRECTED BELOW. DISCONTINUE all vitamins, herbals and supplements 3 weeks prior to surgery. DISCONTINUE Non-Steroidal Anti-Inflammatory (NSAIDS) such as Advil, Ibuprofen, Motrin, Naproxen and Aleve 5 days prior to surgery. Home Medications to take  the day of surgery   Atorvastatin  Finasteride   Pantoprazole        Home Medications   to Hold   Hold Naproxen x 5 days prior to surgery        Comments   On the day before surgery please take Acetaminophen 1000mg in the morning and then again before bed. You may substitute for Tylenol 650 mg.      Bring Dynahex wash and Incentive Spirometer with you to hospital on the day of surgery. Please do not bring home medications with you on the day of surgery unless otherwise directed by your nurse. If you are instructed to bring home medications, please give them to your nurse as they will be administered by the nursing staff. If you have any questions, please call [address] [place] [address] [phone]. A copy of this note was provided to the patient for reference.

## 2023-11-19 ENCOUNTER — ANESTHESIA EVENT (OUTPATIENT)
Dept: SURGERY | Age: 68
DRG: 468 | End: 2023-11-19
Payer: MEDICARE

## 2023-11-19 RX ORDER — FENTANYL CITRATE 50 UG/ML
100 INJECTION, SOLUTION INTRAMUSCULAR; INTRAVENOUS
Status: CANCELLED | OUTPATIENT
Start: 2023-11-19 | End: 2023-11-20

## 2023-11-19 RX ORDER — MIDAZOLAM HYDROCHLORIDE 2 MG/2ML
2 INJECTION, SOLUTION INTRAMUSCULAR; INTRAVENOUS
Status: CANCELLED | OUTPATIENT
Start: 2023-11-19 | End: 2023-11-20

## 2023-11-19 RX ORDER — SODIUM CHLORIDE 0.9 % (FLUSH) 0.9 %
5-40 SYRINGE (ML) INJECTION PRN
Status: CANCELLED | OUTPATIENT
Start: 2023-11-19

## 2023-11-19 RX ORDER — SODIUM CHLORIDE 0.9 % (FLUSH) 0.9 %
5-40 SYRINGE (ML) INJECTION EVERY 12 HOURS SCHEDULED
Status: CANCELLED | OUTPATIENT
Start: 2023-11-19

## 2023-11-19 RX ORDER — SODIUM CHLORIDE 9 MG/ML
INJECTION, SOLUTION INTRAVENOUS PRN
Status: CANCELLED | OUTPATIENT
Start: 2023-11-19

## 2023-11-20 ENCOUNTER — APPOINTMENT (OUTPATIENT)
Dept: GENERAL RADIOLOGY | Age: 68
DRG: 468 | End: 2023-11-20
Attending: ORTHOPAEDIC SURGERY
Payer: MEDICARE

## 2023-11-20 ENCOUNTER — ANESTHESIA (OUTPATIENT)
Dept: SURGERY | Age: 68
DRG: 468 | End: 2023-11-20
Payer: MEDICARE

## 2023-11-20 ENCOUNTER — HOSPITAL ENCOUNTER (INPATIENT)
Age: 68
LOS: 1 days | Discharge: HOME HEALTH | DRG: 468 | End: 2023-11-21
Attending: ORTHOPAEDIC SURGERY | Admitting: ORTHOPAEDIC SURGERY
Payer: MEDICARE

## 2023-11-20 DIAGNOSIS — G89.18 POST-OP PAIN: Primary | ICD-10-CM

## 2023-11-20 DIAGNOSIS — Z96.641 HX OF TOTAL HIP ARTHROPLASTY, RIGHT: ICD-10-CM

## 2023-11-20 DIAGNOSIS — Z96.649 STATUS POST REVISION OF TOTAL HIP REPLACEMENT: ICD-10-CM

## 2023-11-20 DIAGNOSIS — T84.010A: ICD-10-CM

## 2023-11-20 PROBLEM — T84.018S FAILED TOTAL HIP ARTHROPLASTY, SEQUELA: Status: ACTIVE | Noted: 2023-11-20

## 2023-11-20 LAB
ABO + RH BLD: NORMAL
BLOOD GROUP ANTIBODIES SERPL: NORMAL
GLUCOSE BLD STRIP.AUTO-MCNC: 101 MG/DL (ref 65–100)
SERVICE CMNT-IMP: ABNORMAL
SPECIMEN EXP DATE BLD: NORMAL

## 2023-11-20 PROCEDURE — A4217 STERILE WATER/SALINE, 500 ML: HCPCS | Performed by: ORTHOPAEDIC SURGERY

## 2023-11-20 PROCEDURE — 6370000000 HC RX 637 (ALT 250 FOR IP): Performed by: ANESTHESIOLOGY

## 2023-11-20 PROCEDURE — C1713 ANCHOR/SCREW BN/BN,TIS/BN: HCPCS | Performed by: ORTHOPAEDIC SURGERY

## 2023-11-20 PROCEDURE — 3600000015 HC SURGERY LEVEL 5 ADDTL 15MIN: Performed by: ORTHOPAEDIC SURGERY

## 2023-11-20 PROCEDURE — 82962 GLUCOSE BLOOD TEST: CPT

## 2023-11-20 PROCEDURE — 3700000000 HC ANESTHESIA ATTENDED CARE: Performed by: ORTHOPAEDIC SURGERY

## 2023-11-20 PROCEDURE — 86900 BLOOD TYPING SEROLOGIC ABO: CPT

## 2023-11-20 PROCEDURE — 97530 THERAPEUTIC ACTIVITIES: CPT

## 2023-11-20 PROCEDURE — 2500000003 HC RX 250 WO HCPCS: Performed by: NURSE ANESTHETIST, CERTIFIED REGISTERED

## 2023-11-20 PROCEDURE — 6360000002 HC RX W HCPCS: Performed by: PHYSICIAN ASSISTANT

## 2023-11-20 PROCEDURE — 6360000002 HC RX W HCPCS: Performed by: ORTHOPAEDIC SURGERY

## 2023-11-20 PROCEDURE — 6370000000 HC RX 637 (ALT 250 FOR IP): Performed by: PHYSICIAN ASSISTANT

## 2023-11-20 PROCEDURE — 87205 SMEAR GRAM STAIN: CPT

## 2023-11-20 PROCEDURE — 86901 BLOOD TYPING SEROLOGIC RH(D): CPT

## 2023-11-20 PROCEDURE — 87102 FUNGUS ISOLATION CULTURE: CPT

## 2023-11-20 PROCEDURE — 94760 N-INVAS EAR/PLS OXIMETRY 1: CPT

## 2023-11-20 PROCEDURE — 2709999900 HC NON-CHARGEABLE SUPPLY: Performed by: ORTHOPAEDIC SURGERY

## 2023-11-20 PROCEDURE — 72170 X-RAY EXAM OF PELVIS: CPT

## 2023-11-20 PROCEDURE — 6360000002 HC RX W HCPCS: Performed by: NURSE ANESTHETIST, CERTIFIED REGISTERED

## 2023-11-20 PROCEDURE — C1776 JOINT DEVICE (IMPLANTABLE): HCPCS | Performed by: ORTHOPAEDIC SURGERY

## 2023-11-20 PROCEDURE — 0SUA09Z SUPPLEMENT RIGHT HIP JOINT, ACETABULAR SURFACE WITH LINER, OPEN APPROACH: ICD-10-PCS | Performed by: ORTHOPAEDIC SURGERY

## 2023-11-20 PROCEDURE — 87075 CULTR BACTERIA EXCEPT BLOOD: CPT

## 2023-11-20 PROCEDURE — 97161 PT EVAL LOW COMPLEX 20 MIN: CPT

## 2023-11-20 PROCEDURE — 1100000000 HC RM PRIVATE

## 2023-11-20 PROCEDURE — 3600000005 HC SURGERY LEVEL 5 BASE: Performed by: ORTHOPAEDIC SURGERY

## 2023-11-20 PROCEDURE — 0SP909Z REMOVAL OF LINER FROM RIGHT HIP JOINT, OPEN APPROACH: ICD-10-PCS | Performed by: ORTHOPAEDIC SURGERY

## 2023-11-20 PROCEDURE — 2720000010 HC SURG SUPPLY STERILE: Performed by: ORTHOPAEDIC SURGERY

## 2023-11-20 PROCEDURE — 2580000003 HC RX 258: Performed by: ANESTHESIOLOGY

## 2023-11-20 PROCEDURE — 94761 N-INVAS EAR/PLS OXIMETRY MLT: CPT

## 2023-11-20 PROCEDURE — 0SPR0JZ REMOVAL OF SYNTHETIC SUBSTITUTE FROM RIGHT HIP JOINT, FEMORAL SURFACE, OPEN APPROACH: ICD-10-PCS | Performed by: ORTHOPAEDIC SURGERY

## 2023-11-20 PROCEDURE — 2580000003 HC RX 258: Performed by: ORTHOPAEDIC SURGERY

## 2023-11-20 PROCEDURE — 2580000003 HC RX 258: Performed by: NURSE ANESTHETIST, CERTIFIED REGISTERED

## 2023-11-20 PROCEDURE — 7100000001 HC PACU RECOVERY - ADDTL 15 MIN: Performed by: ORTHOPAEDIC SURGERY

## 2023-11-20 PROCEDURE — 87206 SMEAR FLUORESCENT/ACID STAI: CPT

## 2023-11-20 PROCEDURE — 7100000000 HC PACU RECOVERY - FIRST 15 MIN: Performed by: ORTHOPAEDIC SURGERY

## 2023-11-20 PROCEDURE — 2580000003 HC RX 258: Performed by: PHYSICIAN ASSISTANT

## 2023-11-20 PROCEDURE — 87070 CULTURE OTHR SPECIMN AEROBIC: CPT

## 2023-11-20 PROCEDURE — 86850 RBC ANTIBODY SCREEN: CPT

## 2023-11-20 PROCEDURE — 2500000003 HC RX 250 WO HCPCS: Performed by: ANESTHESIOLOGY

## 2023-11-20 PROCEDURE — 3700000001 HC ADD 15 MINUTES (ANESTHESIA): Performed by: ORTHOPAEDIC SURGERY

## 2023-11-20 PROCEDURE — 0SRR0JZ REPLACEMENT OF RIGHT HIP JOINT, FEMORAL SURFACE WITH SYNTHETIC SUBSTITUTE, OPEN APPROACH: ICD-10-PCS | Performed by: ORTHOPAEDIC SURGERY

## 2023-11-20 DEVICE — R3 ANTEVERTED XLPE LINER 40MM                                    INNER DIAMETER X OUTER DIAMETER 54MM +4
Type: IMPLANTABLE DEVICE | Site: HIP | Status: FUNCTIONAL
Brand: R3

## 2023-11-20 DEVICE — TITANIUM MODULAR HEAD SLEEVE 12/14                                    TAPER +8: Type: IMPLANTABLE DEVICE | Site: HIP | Status: FUNCTIONAL

## 2023-11-20 DEVICE — 40MM OXINIUM MODULAR FEMORAL HEAD
Type: IMPLANTABLE DEVICE | Site: HIP | Status: FUNCTIONAL
Brand: OXINIUM

## 2023-11-20 DEVICE — STIMULAN® RAPID CURE PROVIDED STERILE FOR SINGLE PATIENT USE. STIMULAN® RAPID CURE CONTAINS CALCIUM SULFATE POWDER AND MIXING SOLUTION IN PRE-MEASURED QUANTITIES SO THAT WHEN MIXED TOGETHER IN A STERILE MIXING BOWL, THE RESULTANT PASTE IS TO BE DIGITALLY PACKED INTO OPEN BONE VOID/GAP TO SET INSITU OR PLACED INTO THE MOULD PROVIDED, THE MIXTURE SETS TO FORM BEADS. THE BIODEGRADABLE, RADIOPAQUE BEADS ARE RESORBED IN APPROXIMATELY 30 – 60 DAYS WHEN USED IN ACCORDANCE WITH THE DEVICE LABELLING. STIMULAN® RAPID CURE IS MANUFACTURED FROM SYNTHETIC IMPLANT GRADE CALCIUM SULFATE DIHYDRATE(CASO4.2H2O) THAT RESORBS AND IS REPLACED WITH BONE DURING THE HEALING PROCESS. ALSO, AS THE BONE VOID FILLER BEADS ARE BIODEGRADABLE AND BIOCOMPATIBLE, THEY MAY BE USED AT AN INFECTED SITE.
Type: IMPLANTABLE DEVICE | Site: HIP | Status: FUNCTIONAL
Brand: STIMULAN® RAPID CURE

## 2023-11-20 RX ORDER — ASPIRIN 81 MG/1
81 TABLET ORAL DAILY
Status: DISCONTINUED | OUTPATIENT
Start: 2023-11-20 | End: 2023-11-21 | Stop reason: HOSPADM

## 2023-11-20 RX ORDER — KETAMINE HYDROCHLORIDE 50 MG/ML
INJECTION, SOLUTION, CONCENTRATE INTRAMUSCULAR; INTRAVENOUS PRN
Status: DISCONTINUED | OUTPATIENT
Start: 2023-11-20 | End: 2023-11-20 | Stop reason: SDUPTHER

## 2023-11-20 RX ORDER — PROMETHAZINE HYDROCHLORIDE 12.5 MG/1
12.5 TABLET ORAL 4 TIMES DAILY PRN
Qty: 20 TABLET | Refills: 2 | Status: SHIPPED | OUTPATIENT
Start: 2023-11-20

## 2023-11-20 RX ORDER — ACETAMINOPHEN 325 MG/1
650 TABLET ORAL EVERY 6 HOURS
Status: DISCONTINUED | OUTPATIENT
Start: 2023-11-20 | End: 2023-11-21 | Stop reason: HOSPADM

## 2023-11-20 RX ORDER — DEXAMETHASONE SODIUM PHOSPHATE 10 MG/ML
INJECTION INTRAMUSCULAR; INTRAVENOUS PRN
Status: DISCONTINUED | OUTPATIENT
Start: 2023-11-20 | End: 2023-11-20 | Stop reason: SDUPTHER

## 2023-11-20 RX ORDER — SODIUM CHLORIDE, SODIUM LACTATE, POTASSIUM CHLORIDE, CALCIUM CHLORIDE 600; 310; 30; 20 MG/100ML; MG/100ML; MG/100ML; MG/100ML
INJECTION, SOLUTION INTRAVENOUS CONTINUOUS PRN
Status: DISCONTINUED | OUTPATIENT
Start: 2023-11-20 | End: 2023-11-20 | Stop reason: SDUPTHER

## 2023-11-20 RX ORDER — ONDANSETRON 2 MG/ML
4 INJECTION INTRAMUSCULAR; INTRAVENOUS
Status: DISCONTINUED | OUTPATIENT
Start: 2023-11-20 | End: 2023-11-20 | Stop reason: HOSPADM

## 2023-11-20 RX ORDER — HYDROMORPHONE HYDROCHLORIDE 1 MG/ML
0.25 INJECTION, SOLUTION INTRAMUSCULAR; INTRAVENOUS; SUBCUTANEOUS EVERY 5 MIN PRN
Status: DISCONTINUED | OUTPATIENT
Start: 2023-11-20 | End: 2023-11-20 | Stop reason: HOSPADM

## 2023-11-20 RX ORDER — NEOSTIGMINE METHYLSULFATE 1 MG/ML
INJECTION, SOLUTION INTRAVENOUS PRN
Status: DISCONTINUED | OUTPATIENT
Start: 2023-11-20 | End: 2023-11-20 | Stop reason: SDUPTHER

## 2023-11-20 RX ORDER — ROPIVACAINE HYDROCHLORIDE 2 MG/ML
INJECTION, SOLUTION EPIDURAL; INFILTRATION; PERINEURAL PRN
Status: DISCONTINUED | OUTPATIENT
Start: 2023-11-20 | End: 2023-11-20 | Stop reason: ALTCHOICE

## 2023-11-20 RX ORDER — ONDANSETRON 2 MG/ML
4 INJECTION INTRAMUSCULAR; INTRAVENOUS EVERY 6 HOURS PRN
Status: DISCONTINUED | OUTPATIENT
Start: 2023-11-20 | End: 2023-11-21 | Stop reason: HOSPADM

## 2023-11-20 RX ORDER — PROCHLORPERAZINE EDISYLATE 5 MG/ML
5 INJECTION INTRAMUSCULAR; INTRAVENOUS
Status: DISCONTINUED | OUTPATIENT
Start: 2023-11-20 | End: 2023-11-20 | Stop reason: HOSPADM

## 2023-11-20 RX ORDER — FINASTERIDE 5 MG/1
5 TABLET, FILM COATED ORAL DAILY
Status: DISCONTINUED | OUTPATIENT
Start: 2023-11-21 | End: 2023-11-21 | Stop reason: HOSPADM

## 2023-11-20 RX ORDER — METHOCARBAMOL 750 MG/1
750 TABLET, FILM COATED ORAL 3 TIMES DAILY PRN
Qty: 40 TABLET | Refills: 1 | Status: SHIPPED | OUTPATIENT
Start: 2023-11-20

## 2023-11-20 RX ORDER — HYDRALAZINE HYDROCHLORIDE 20 MG/ML
10 INJECTION INTRAMUSCULAR; INTRAVENOUS
Status: DISCONTINUED | OUTPATIENT
Start: 2023-11-20 | End: 2023-11-20 | Stop reason: HOSPADM

## 2023-11-20 RX ORDER — SODIUM CHLORIDE 0.9 % (FLUSH) 0.9 %
5-40 SYRINGE (ML) INJECTION EVERY 12 HOURS SCHEDULED
Status: DISCONTINUED | OUTPATIENT
Start: 2023-11-20 | End: 2023-11-21 | Stop reason: HOSPADM

## 2023-11-20 RX ORDER — GENTAMICIN SULFATE 80 MG/100ML
INJECTION, SOLUTION INTRAVENOUS CONTINUOUS PRN
Status: COMPLETED | OUTPATIENT
Start: 2023-11-20 | End: 2023-11-20

## 2023-11-20 RX ORDER — SODIUM CHLORIDE 0.9 % (FLUSH) 0.9 %
5-40 SYRINGE (ML) INJECTION PRN
Status: DISCONTINUED | OUTPATIENT
Start: 2023-11-20 | End: 2023-11-21 | Stop reason: HOSPADM

## 2023-11-20 RX ORDER — SENNA AND DOCUSATE SODIUM 50; 8.6 MG/1; MG/1
1 TABLET, FILM COATED ORAL 2 TIMES DAILY
Status: DISCONTINUED | OUTPATIENT
Start: 2023-11-20 | End: 2023-11-21 | Stop reason: HOSPADM

## 2023-11-20 RX ORDER — OXYCODONE HYDROCHLORIDE 5 MG/1
5-10 TABLET ORAL
Qty: 60 TABLET | Refills: 0 | Status: SHIPPED | OUTPATIENT
Start: 2023-11-20 | End: 2023-11-25

## 2023-11-20 RX ORDER — ROCURONIUM BROMIDE 10 MG/ML
INJECTION, SOLUTION INTRAVENOUS PRN
Status: DISCONTINUED | OUTPATIENT
Start: 2023-11-20 | End: 2023-11-20 | Stop reason: SDUPTHER

## 2023-11-20 RX ORDER — ACETAMINOPHEN 500 MG
1000 TABLET ORAL ONCE
Status: COMPLETED | OUTPATIENT
Start: 2023-11-20 | End: 2023-11-20

## 2023-11-20 RX ORDER — OXYCODONE HYDROCHLORIDE 5 MG/1
10 TABLET ORAL EVERY 4 HOURS PRN
Status: DISCONTINUED | OUTPATIENT
Start: 2023-11-20 | End: 2023-11-21 | Stop reason: HOSPADM

## 2023-11-20 RX ORDER — EPHEDRINE SULFATE/0.9% NACL/PF 50 MG/5 ML
SYRINGE (ML) INTRAVENOUS PRN
Status: DISCONTINUED | OUTPATIENT
Start: 2023-11-20 | End: 2023-11-20 | Stop reason: SDUPTHER

## 2023-11-20 RX ORDER — LABETALOL HYDROCHLORIDE 5 MG/ML
10 INJECTION, SOLUTION INTRAVENOUS
Status: DISCONTINUED | OUTPATIENT
Start: 2023-11-20 | End: 2023-11-20 | Stop reason: HOSPADM

## 2023-11-20 RX ORDER — ONDANSETRON 2 MG/ML
INJECTION INTRAMUSCULAR; INTRAVENOUS PRN
Status: DISCONTINUED | OUTPATIENT
Start: 2023-11-20 | End: 2023-11-20 | Stop reason: SDUPTHER

## 2023-11-20 RX ORDER — MIDAZOLAM HYDROCHLORIDE 1 MG/ML
INJECTION INTRAMUSCULAR; INTRAVENOUS PRN
Status: DISCONTINUED | OUTPATIENT
Start: 2023-11-20 | End: 2023-11-20 | Stop reason: SDUPTHER

## 2023-11-20 RX ORDER — GLYCOPYRROLATE 0.2 MG/ML
INJECTION INTRAMUSCULAR; INTRAVENOUS PRN
Status: DISCONTINUED | OUTPATIENT
Start: 2023-11-20 | End: 2023-11-20 | Stop reason: SDUPTHER

## 2023-11-20 RX ORDER — LIDOCAINE HYDROCHLORIDE 20 MG/ML
INJECTION, SOLUTION EPIDURAL; INFILTRATION; INTRACAUDAL; PERINEURAL PRN
Status: DISCONTINUED | OUTPATIENT
Start: 2023-11-20 | End: 2023-11-20 | Stop reason: SDUPTHER

## 2023-11-20 RX ORDER — LIDOCAINE HYDROCHLORIDE 10 MG/ML
1 INJECTION, SOLUTION INFILTRATION; PERINEURAL
Status: COMPLETED | OUTPATIENT
Start: 2023-11-20 | End: 2023-11-20

## 2023-11-20 RX ORDER — DIPHENHYDRAMINE HCL 25 MG
25 CAPSULE ORAL EVERY 6 HOURS PRN
Status: DISCONTINUED | OUTPATIENT
Start: 2023-11-20 | End: 2023-11-21 | Stop reason: HOSPADM

## 2023-11-20 RX ORDER — HYDROMORPHONE HYDROCHLORIDE 1 MG/ML
0.5 INJECTION, SOLUTION INTRAMUSCULAR; INTRAVENOUS; SUBCUTANEOUS EVERY 5 MIN PRN
Status: DISCONTINUED | OUTPATIENT
Start: 2023-11-20 | End: 2023-11-20 | Stop reason: HOSPADM

## 2023-11-20 RX ORDER — SODIUM CHLORIDE, SODIUM LACTATE, POTASSIUM CHLORIDE, CALCIUM CHLORIDE 600; 310; 30; 20 MG/100ML; MG/100ML; MG/100ML; MG/100ML
INJECTION, SOLUTION INTRAVENOUS CONTINUOUS
Status: DISCONTINUED | OUTPATIENT
Start: 2023-11-20 | End: 2023-11-20 | Stop reason: HOSPADM

## 2023-11-20 RX ORDER — DIPHENHYDRAMINE HYDROCHLORIDE 50 MG/ML
25 INJECTION INTRAMUSCULAR; INTRAVENOUS EVERY 6 HOURS PRN
Status: DISCONTINUED | OUTPATIENT
Start: 2023-11-20 | End: 2023-11-21 | Stop reason: HOSPADM

## 2023-11-20 RX ORDER — KETOROLAC TROMETHAMINE 30 MG/ML
INJECTION, SOLUTION INTRAMUSCULAR; INTRAVENOUS PRN
Status: DISCONTINUED | OUTPATIENT
Start: 2023-11-20 | End: 2023-11-20 | Stop reason: ALTCHOICE

## 2023-11-20 RX ORDER — ASPIRIN 81 MG/1
81 TABLET ORAL 2 TIMES DAILY
Qty: 70 TABLET | Refills: 0 | Status: SHIPPED | OUTPATIENT
Start: 2023-11-20 | End: 2023-12-25

## 2023-11-20 RX ORDER — HYDROMORPHONE HYDROCHLORIDE 2 MG/ML
INJECTION, SOLUTION INTRAMUSCULAR; INTRAVENOUS; SUBCUTANEOUS PRN
Status: DISCONTINUED | OUTPATIENT
Start: 2023-11-20 | End: 2023-11-20 | Stop reason: SDUPTHER

## 2023-11-20 RX ORDER — SODIUM CHLORIDE 9 MG/ML
INJECTION, SOLUTION INTRAVENOUS PRN
Status: DISCONTINUED | OUTPATIENT
Start: 2023-11-20 | End: 2023-11-21 | Stop reason: HOSPADM

## 2023-11-20 RX ORDER — OXYCODONE HYDROCHLORIDE 5 MG/1
5 TABLET ORAL
Status: COMPLETED | OUTPATIENT
Start: 2023-11-20 | End: 2023-11-20

## 2023-11-20 RX ORDER — FENTANYL CITRATE 50 UG/ML
INJECTION, SOLUTION INTRAMUSCULAR; INTRAVENOUS PRN
Status: DISCONTINUED | OUTPATIENT
Start: 2023-11-20 | End: 2023-11-20 | Stop reason: SDUPTHER

## 2023-11-20 RX ORDER — ONDANSETRON 4 MG/1
4 TABLET, ORALLY DISINTEGRATING ORAL EVERY 8 HOURS PRN
Status: DISCONTINUED | OUTPATIENT
Start: 2023-11-20 | End: 2023-11-21 | Stop reason: HOSPADM

## 2023-11-20 RX ORDER — PROPOFOL 10 MG/ML
INJECTION, EMULSION INTRAVENOUS PRN
Status: DISCONTINUED | OUTPATIENT
Start: 2023-11-20 | End: 2023-11-20 | Stop reason: SDUPTHER

## 2023-11-20 RX ADMIN — Medication 1000 MG: at 14:47

## 2023-11-20 RX ADMIN — SODIUM CHLORIDE, PRESERVATIVE FREE 10 ML: 5 INJECTION INTRAVENOUS at 20:19

## 2023-11-20 RX ADMIN — GLYCOPYRROLATE 0.4 MG: 0.2 INJECTION INTRAMUSCULAR; INTRAVENOUS at 14:41

## 2023-11-20 RX ADMIN — OXYCODONE 10 MG: 5 TABLET ORAL at 17:01

## 2023-11-20 RX ADMIN — Medication 2 G: at 13:09

## 2023-11-20 RX ADMIN — Medication 10 MG: at 14:33

## 2023-11-20 RX ADMIN — SODIUM CHLORIDE, SODIUM LACTATE, POTASSIUM CHLORIDE, AND CALCIUM CHLORIDE: 600; 310; 30; 20 INJECTION, SOLUTION INTRAVENOUS at 13:29

## 2023-11-20 RX ADMIN — GLYCOPYRROLATE 0.2 MG: 0.2 INJECTION INTRAMUSCULAR; INTRAVENOUS at 13:23

## 2023-11-20 RX ADMIN — ROCURONIUM BROMIDE 50 MG: 50 INJECTION, SOLUTION INTRAVENOUS at 12:58

## 2023-11-20 RX ADMIN — HYDROMORPHONE HYDROCHLORIDE 1 MG: 2 INJECTION INTRAMUSCULAR; INTRAVENOUS; SUBCUTANEOUS at 13:47

## 2023-11-20 RX ADMIN — ACETAMINOPHEN 1000 MG: 500 TABLET, FILM COATED ORAL at 11:22

## 2023-11-20 RX ADMIN — HYDROMORPHONE HYDROCHLORIDE 0.5 MG: 2 INJECTION INTRAMUSCULAR; INTRAVENOUS; SUBCUTANEOUS at 15:15

## 2023-11-20 RX ADMIN — LIDOCAINE HYDROCHLORIDE 1 ML: 10 INJECTION, SOLUTION INFILTRATION; PERINEURAL at 11:42

## 2023-11-20 RX ADMIN — ACETAMINOPHEN 650 MG: 325 TABLET, FILM COATED ORAL at 17:01

## 2023-11-20 RX ADMIN — OXYCODONE 10 MG: 5 TABLET ORAL at 20:52

## 2023-11-20 RX ADMIN — CEFAZOLIN 2000 MG: 10 INJECTION, POWDER, FOR SOLUTION INTRAVENOUS at 20:18

## 2023-11-20 RX ADMIN — Medication 3 MG: at 14:41

## 2023-11-20 RX ADMIN — Medication 1000 MG: at 13:12

## 2023-11-20 RX ADMIN — PROPOFOL 180 MG: 10 INJECTION, EMULSION INTRAVENOUS at 12:58

## 2023-11-20 RX ADMIN — DOCUSATE SODIUM 50MG AND SENNOSIDES 8.6MG 1 TABLET: 8.6; 5 TABLET, FILM COATED ORAL at 20:18

## 2023-11-20 RX ADMIN — FENTANYL CITRATE 50 MCG: 50 INJECTION, SOLUTION INTRAMUSCULAR; INTRAVENOUS at 12:51

## 2023-11-20 RX ADMIN — ONDANSETRON 4 MG: 2 INJECTION INTRAMUSCULAR; INTRAVENOUS at 14:56

## 2023-11-20 RX ADMIN — SODIUM CHLORIDE, SODIUM LACTATE, POTASSIUM CHLORIDE, AND CALCIUM CHLORIDE: 600; 310; 30; 20 INJECTION, SOLUTION INTRAVENOUS at 12:34

## 2023-11-20 RX ADMIN — FENTANYL CITRATE 50 MCG: 50 INJECTION, SOLUTION INTRAMUSCULAR; INTRAVENOUS at 12:58

## 2023-11-20 RX ADMIN — LIDOCAINE HYDROCHLORIDE 80 MG: 20 INJECTION, SOLUTION EPIDURAL; INFILTRATION; INTRACAUDAL; PERINEURAL at 12:58

## 2023-11-20 RX ADMIN — OXYCODONE HYDROCHLORIDE 5 MG: 5 TABLET ORAL at 15:28

## 2023-11-20 RX ADMIN — KETAMINE HYDROCHLORIDE 20 MG: 50 INJECTION, SOLUTION INTRAMUSCULAR; INTRAVENOUS at 14:52

## 2023-11-20 RX ADMIN — DEXAMETHASONE SODIUM PHOSPHATE 8 MG: 10 INJECTION INTRAMUSCULAR; INTRAVENOUS at 14:32

## 2023-11-20 RX ADMIN — SODIUM CHLORIDE, POTASSIUM CHLORIDE, SODIUM LACTATE AND CALCIUM CHLORIDE: 600; 310; 30; 20 INJECTION, SOLUTION INTRAVENOUS at 11:42

## 2023-11-20 RX ADMIN — MIDAZOLAM 2 MG: 1 INJECTION INTRAMUSCULAR; INTRAVENOUS at 12:39

## 2023-11-20 ASSESSMENT — PAIN SCALES - GENERAL
PAINLEVEL_OUTOF10: 3
PAINLEVEL_OUTOF10: 7
PAINLEVEL_OUTOF10: 4
PAINLEVEL_OUTOF10: 3
PAINLEVEL_OUTOF10: 0
PAINLEVEL_OUTOF10: 0
PAINLEVEL_OUTOF10: 7

## 2023-11-20 ASSESSMENT — HOOS JR
BENDING TO THE FLOOR TO PICK UP OBJECT: 1
HOOS JR RAW SCORE: 6
LYING IN BED (TURNING OVER, MAINTAINING HIP POSITION): 1
HOOS JR RAW SCORE: 6
HOOS JR TOTAL INTERVAL SCORE: 70.426
RISING FROM SITTING: 1
SITTING: 1
GOING UP OR DOWN STAIRS: 1
WALKING ON UNEVEN SURFACE: 1

## 2023-11-20 ASSESSMENT — PAIN DESCRIPTION - LOCATION
LOCATION: HIP

## 2023-11-20 ASSESSMENT — PAIN DESCRIPTION - ORIENTATION
ORIENTATION: RIGHT

## 2023-11-20 ASSESSMENT — PAIN SCALES - WONG BAKER: WONGBAKER_NUMERICALRESPONSE: 0

## 2023-11-20 ASSESSMENT — PAIN DESCRIPTION - DESCRIPTORS: DESCRIPTORS: ACHING

## 2023-11-20 ASSESSMENT — PAIN - FUNCTIONAL ASSESSMENT: PAIN_FUNCTIONAL_ASSESSMENT: 0-10

## 2023-11-20 ASSESSMENT — PAIN DESCRIPTION - ONSET: ONSET: PROGRESSIVE

## 2023-11-20 ASSESSMENT — PAIN DESCRIPTION - PAIN TYPE: TYPE: SURGICAL PAIN

## 2023-11-20 NOTE — ANESTHESIA PROCEDURE NOTES
Spinal Block    Patient location during procedure: OR  End time: 11/20/2023 12:55 PM  Reason for block: primary anesthetic and at surgeon's request  Staffing  Performed: anesthesiologist   Anesthesiologist: Marylen Opitz, DO  Performed by: Marylen Opitz, DO  Authorized by: Marylen Opitz, DO    Spinal Block  Patient position: sitting  Prep: ChloraPrep  Patient monitoring: cardiac monitor, continuous pulse ox, continuous capnometry, frequent blood pressure checks and oxygen  Approach: midline  Provider prep: mask and sterile gloves  Local infiltration: lidocaine  Needle  Needle type:  Benigno   Needle gauge: 25 G  Needle length: 3.5 in  Assessment  Attempts: 3+  Additional Notes  Multiple attempts made at multiple levels with inability to access intrathecal space  Preanesthetic Checklist  Completed: patient identified, IV checked, site marked, risks and benefits discussed, surgical/procedural consents, equipment checked, pre-op evaluation, timeout performed, anesthesia consent given, oxygen available and monitors applied/VS acknowledged

## 2023-11-20 NOTE — RT PROTOCOL NOTE
Respiratory Care Services     Policy Number: [id number]    Title: Oxygen Protocol    Effective Date: 01/1996    Revised Date: 06/2013, 02/29/2016, 4/2018, 7/2019    Reviewed Date: 05/2014, 03/2015, 06/2017, 11/2020        I. Policy: The Oxygen Protocol will be initiated for all patients upon written order from physician for administration of oxygen therapy or if a patient is found to have an oxygen saturation of 88% or less. Special consideration: the goal of oxygen therapy for COPD patients is to maintain oxygen saturation between 88% - 92% to comply with GOLD Guidelines. II. Purpose: To provide protocol driven respiratory therapy for the administration of oxygen at concentrations greater than that in ambient air with the intent of treating or preventing the symptoms and manifestations of hypoxia. III. Responsibility: Director Respiratory Care Services, all Respiratory Care Practitioners     IV. Indications:   Implement this protocol for patients when physician orders oxygen to be administered or when patient is found to have an oxygen saturation of 88% or less. To assure routine monitoring of patient's oxygen saturation b.i.d. and to make appropriate adjustments in accordance with ordered oxygen saturation parameters. To assure continuity of respiratory care that meets [facility] Clinical Practice Guidelines and GOLD Guidelines. Hb < 8  Sickle Cell anemia crisis    V. Assessment:  Assess the following parameters to determine the need to adjust oxygen:  Measurement of patient's oxygen saturation via pulse oximetry. Observation of patient's color, respiratory effort, and responsiveness. Measurement of heart rate and respiratory rate. Complete a three-step ambulatory oxygen saturation when ordered. VI. Initiation:  Upon receipt of an order for oxygen, the RCP will:   Verify order in the patient's EMR, which should include the desired oxygen saturation to be maintained.   The patient shall be placed on

## 2023-11-20 NOTE — ANESTHESIA PRE PROCEDURE
Department of Anesthesiology  Preprocedure Note       Name:  Kimber Aldrich   Age:  69 y.o.  :  1955                                          MRN:  [id number]         Date:  2023      Surgeon: Ilda Reeves):  Sacha Salguero MD    Procedure: Procedure(s):  rt HIP TOTAL ARTHROPLASTY REVISION- poss just poly swap    Medications prior to admission:   Prior to Admission medications    Medication Sig Start Date End Date Taking? Authorizing Provider   Multiple Vitamins-Minerals (CENTRUM SILVER 50+MEN PO) Take 1 tablet by mouth daily    Zane Talley MD   naproxen (EC NAPROSYN) 500 MG EC tablet Take 1 tablet by mouth 2 times daily (with meals)    Zane Talley MD   cyanocobalamin 1000 MCG tablet Take 1 tablet by mouth daily    Zane Talley MD   atorvastatin (LIPITOR) 10 MG tablet Take 1 tablet by mouth daily 22   Zane Talley MD   celecoxib (CELEBREX) 200 MG capsule Take 1 capsule by mouth daily  Patient not taking: Reported on 10/30/2023 11/28/22   Zane Talley MD   diclofenac (VOLTAREN) 50 MG EC tablet TAKE 1 TABLET (50 MG) BY MOUTH 2 (TWO) TIMES A DAY  Patient not taking: Reported on 10/30/2023 9/20/23   Zane Talley MD   finasteride (PROSCAR) 5 MG tablet Take 1 tablet by mouth daily 23   Zane Talley MD   hydroCHLOROthiazide (MICROZIDE) 12.5 MG capsule Take 1 capsule by mouth every other day  Patient not taking: Reported on 10/30/2023 8/23/23   Zane Talley MD   Mesalamine-Cleanser 4 g KIT INSERT 60 ML INTO THE RECTUM DAILY 23   Zane Talley MD   pantoprazole (PROTONIX) 40 MG tablet Take 1 tablet by mouth daily 22   Zane Talley MD   tadalafil (CIALIS) 20 MG tablet Take 1 tablet by mouth as needed 23   Zane Talley MD   zaleplon (SONATA) 10 MG capsule Take 1 capsule by mouth.  23   Zane Talley MD   Cholecalciferol 50 MCG (2000 UT) TABS Take 400 Units by mouth

## 2023-11-20 NOTE — CARE COORDINATION
Patient is a 69y.o. year old male admitted for Right SUNNY . Patient plans to return home on discharge. Order received to arrange home health. I met with patient and his wife. Pt reports he has already made arrangements to go directly to outpt PT at [facility] for next Monday and had planned for them to remove his dressing. Patient made aware that Dr. Rene Cheng has referred him to [facility] for home PT. Patient plans to talk with Dr. Rene Cheng in am and will let us know his final plans. Patient denies any equipment needs as patient has a walker. Will follow until discharge. 11/20/23 6767   Service Assessment   Patient Orientation Alert and Oriented   Cognition Alert   History Provided By Patient   Primary Caregiver Spouse   Accompanied By/Relationship spouse   Support Systems Spouse/Significant Other   Patient's Healthcare Decision Maker is: Legal Next of Kin   PCP Verified by CM No   Prior Functional Level Independent in ADLs/IADLs   Current Functional Level Independent in ADLs/IADLs   Can patient return to prior living arrangement Yes   Ability to make needs known: Good   Family able to assist with home care needs: Yes   Would you like for me to discuss the discharge plan with any other family members/significant others, and if so, who? No   Financial Resources Medicare   Services At/After Discharge   Transition of Care Consult (CM Consult) [facility] Discharge Home Health;PT   Mode of Transport at Discharge Self   Confirm Follow Up Transport Self   Condition of Participation: Discharge Planning   The Plan for Transition of Care is related to the following treatment goals: improve mobility   The Patient and/or Patient Representative was provided with a Choice of Provider? Patient   The Patient and/Or Patient Representative agree with the Discharge Plan?  Yes   Freedom of Choice list was provided with basic dialogue that supports the patient's individualized plan of

## 2023-11-20 NOTE — OP NOTE
[facility]   Revision Total Hip Procedure Note    Nishant Reynsoo,  [id number],    1955    Pre-operative Diagnosis:  Failure of right total hip arthroplasty ([address]) [T84.010A]  Hx of total hip arthroplasty, right [Z96.641]  Post-operative Diagnosis: Failed total hip arthroplasty, sequela    Procedure: Revision right SUNNY with removal constrained liner and revision of polyethylene and femoral head  CPT Code: 76988  Location: [address] [place] [address]  Surgeon: Rose Vargas MD   Assistant: Felicia Newman  Anesthesia: Spinal  EBL: 100 cc    Findings: Deficient posterior soft tissue with disruption of the pseudocapsule. Cloudy joint fluid. The locking ring for the polyethylene was loose around the femoral neck. The plastic cup dissociated from the acetabular shell with dislocation of the femoral head. The cup was inspected and was found to be undamaged. The cup was in good position with 40 degrees of horizontal tilt and approximately 10 degrees of anteversion. The trunnion was not significantly damaged. There was no trunnionosis. The femoral stem was well fixed and in appropriate anteversion. Procedure: The complexity of total joint surgery requires the use of a first assistant for positioning, retraction and expertise in closure. Nishant Reynoso was brought to the operating room and positioned on the operating table. Anesthesia was administered. IV antibiotics were administered. A time out identifying the patient, procedure, operative side and surgeon was administered and charted by the OR Nurse. Nishant Reynoso was positioned in the  contralateral decubitus position. The limb was prepped and draped in the usual sterile fashion. The posterior approach was utilized to expose the hip. The previous incision was utilized and was elliptically excised.   The incision was carried through the subcutaneous tissue and underlying fascia with hemostasis obtained using

## 2023-11-20 NOTE — PERIOP NOTE
TRANSFER - OUT REPORT:    Verbal report given to PILO Martinez on Luis Adair  being transferred to [facility] for routine post-op       Report consisted of patient's Situation, Background, Assessment and   Recommendations(SBAR). Information from the following report(s) Nurse Handoff Report was reviewed with the receiving nurse. Lines:   Peripheral IV 11/20/23 Left;Posterior Hand (Active)   Site Assessment Clean, dry & intact 11/20/23 1512   Line Status Infusing 11/20/23 602 [address] Connections checked and tightened 11/20/23 1512   Phlebitis Assessment No symptoms 11/20/23 1512   Infiltration Assessment 0 11/20/23 1512   Alcohol Cap Used No 11/20/23 1512   Dressing Status Clean, dry & intact 11/20/23 1512   Dressing Type Transparent 11/20/23 1512        Opportunity for questions and clarification was provided.       Patient transported with:  O2 @ room air lpm

## 2023-11-20 NOTE — ANESTHESIA POSTPROCEDURE EVALUATION
Department of Anesthesiology  Postprocedure Note    Patient: Lizbet Hernández  MRN: [id number]  YOB: 1955  Date of evaluation: 11/20/2023      Procedure Summary     Date: 11/20/23 Room / Location: [facility] OR 08 / [facility] OR    Anesthesia Start: 1234 Anesthesia Stop: 1518    Procedure: rt HIP TOTAL ARTHROPLASTY REVISION- poss just poly swap (Right: Hip) Diagnosis:       Failure of right total hip arthroplasty ([address])      Hx of total hip arthroplasty, right      (Failure of right total hip arthroplasty ([address]) [T84.010A])      (Hx of total hip arthroplasty, right [R72.047])    Surgeons: Lionel Tse MD Responsible Provider: Akira Nogueira DO    Anesthesia Type: spinal ASA Status: 3          Anesthesia Type: No value filed.     Usman Phase I: Usman Score: 9    Usman Phase II:        Anesthesia Post Evaluation    Patient location during evaluation: PACU  Level of consciousness: awake and alert  Airway patency: patent  Nausea & Vomiting: no nausea  Complications: no  Cardiovascular status: hemodynamically stable  Respiratory status: acceptable  Hydration status: euvolemic  Pain management: satisfactory to patient

## 2023-11-20 NOTE — H&P
10/03/2023 32.2  26.1 - 32.9 PG Final    MCHC 10/03/2023 33.7  31.4 - 35.0 g/dL Final    RDW 10/03/2023 13.7  11.9 - 14.6 % Final    Platelets 65/91/4771 222  150 - 450 K/uL Final    MPV 10/03/2023 11.1  9.4 - 12.3 FL Final    nRBC 10/03/2023 0.00  0.0 - 0.2 K/uL Final    **Note: Absolute NRBC parameter is now reported with Hemogram**    Differential Type 10/03/2023 AUTOMATED    Final    Neutrophils % 10/03/2023 70  43 - 78 % Final    Lymphocytes % 10/03/2023 18  13 - 44 % Final    Monocytes % 10/03/2023 8  4.0 - 12.0 % Final    Eosinophils % 10/03/2023 3  0.5 - 7.8 % Final    Basophils % 10/03/2023 1  0.0 - 2.0 % Final    Immature Granulocytes 10/03/2023 0  0.0 - 5.0 % Final    Neutrophils Absolute 10/03/2023 5.6  1.7 - 8.2 K/UL Final    Lymphocytes Absolute 10/03/2023 1.4  0.5 - 4.6 K/UL Final    Monocytes Absolute 10/03/2023 0.6  0.1 - 1.3 K/UL Final    Eosinophils Absolute 10/03/2023 0.2  0.0 - 0.8 K/UL Final    Basophils Absolute 10/03/2023 0.1  0.0 - 0.2 K/UL Final    Absolute Immature Granulocyte 10/03/2023 0.0  0.0 - 0.5 K/UL Final                 Patient Active Problem List   Diagnosis    Muscle tear    Acute pain of right hip    Failure of right total hip arthroplasty (HCC)    Hx of total hip arthroplasty, right    Failed total hip arthroplasty, sequela         Signed By: Flor Lee MD  November 20, 2023

## 2023-11-21 VITALS
RESPIRATION RATE: 18 BRPM | HEART RATE: 60 BPM | BODY MASS INDEX: 26.21 KG/M2 | HEIGHT: 70 IN | DIASTOLIC BLOOD PRESSURE: 55 MMHG | OXYGEN SATURATION: 94 % | SYSTOLIC BLOOD PRESSURE: 105 MMHG | TEMPERATURE: 98.1 F | WEIGHT: 183.1 LBS

## 2023-11-21 LAB
HCT VFR BLD AUTO: 36.8 % (ref 41.1–50.3)
HGB BLD-MCNC: 12.7 G/DL (ref 13.6–17.2)

## 2023-11-21 PROCEDURE — 6370000000 HC RX 637 (ALT 250 FOR IP): Performed by: PHYSICIAN ASSISTANT

## 2023-11-21 PROCEDURE — 85014 HEMATOCRIT: CPT

## 2023-11-21 PROCEDURE — 6360000002 HC RX W HCPCS: Performed by: PHYSICIAN ASSISTANT

## 2023-11-21 PROCEDURE — 97165 OT EVAL LOW COMPLEX 30 MIN: CPT

## 2023-11-21 PROCEDURE — 85018 HEMOGLOBIN: CPT

## 2023-11-21 PROCEDURE — 97530 THERAPEUTIC ACTIVITIES: CPT

## 2023-11-21 PROCEDURE — 36415 COLL VENOUS BLD VENIPUNCTURE: CPT

## 2023-11-21 PROCEDURE — 2580000003 HC RX 258: Performed by: PHYSICIAN ASSISTANT

## 2023-11-21 RX ORDER — TRAZODONE HYDROCHLORIDE 50 MG/1
50 TABLET ORAL NIGHTLY
Status: DISCONTINUED | OUTPATIENT
Start: 2023-11-21 | End: 2023-11-21 | Stop reason: HOSPADM

## 2023-11-21 RX ADMIN — DOCUSATE SODIUM 50MG AND SENNOSIDES 8.6MG 1 TABLET: 8.6; 5 TABLET, FILM COATED ORAL at 08:16

## 2023-11-21 RX ADMIN — ACETAMINOPHEN 650 MG: 325 TABLET, FILM COATED ORAL at 08:17

## 2023-11-21 RX ADMIN — FINASTERIDE 5 MG: 5 TABLET, FILM COATED ORAL at 08:17

## 2023-11-21 RX ADMIN — TRAZODONE HYDROCHLORIDE 50 MG: 50 TABLET ORAL at 00:28

## 2023-11-21 RX ADMIN — CEFAZOLIN 2000 MG: 10 INJECTION, POWDER, FOR SOLUTION INTRAVENOUS at 05:35

## 2023-11-21 RX ADMIN — ASPIRIN 81 MG: 81 TABLET, COATED ORAL at 08:17

## 2023-11-21 NOTE — PLAN OF CARE
Problem: Chronic Conditions and Co-morbidities  Goal: Patient's chronic conditions and co-morbidity symptoms are monitored and maintained or improved  Outcome: Progressing  Flowsheets  Taken 11/20/2023 2014 by Valentin Brennan - Patient's Chronic Conditions and Co-Morbidity Symptoms are Monitored and Maintained or Improved: Monitor and assess patient's chronic conditions and comorbid symptoms for stability, deterioration, or improvement  Taken 11/20/2023 1600 by Valentin Zacarias - Patient's Chronic Conditions and Co-Morbidity Symptoms are Monitored and Maintained or Improved:   Monitor and assess patient's chronic conditions and comorbid symptoms for stability, deterioration, or improvement   Collaborate with multidisciplinary team to address chronic and comorbid conditions and prevent exacerbation or deterioration   Update acute care plan with appropriate goals if chronic or comorbid symptoms are exacerbated and prevent overall improvement and discharge     Problem: Pain  Goal: Verbalizes/displays adequate comfort level or baseline comfort level  Outcome: Progressing  Flowsheets (Taken 11/20/2023 2052)  Verbalizes/displays adequate comfort level or baseline comfort level:   Encourage patient to monitor pain and request assistance   Administer analgesics based on type and severity of pain and evaluate response     Problem: Safety - Adult  Goal: Free from fall injury  Outcome: Progressing     Problem: ABCDS Injury Assessment  Goal: Absence of physical injury  Outcome: Progressing     Problem: Discharge Planning  Goal: Discharge to home or other facility with appropriate resources  Outcome: Progressing  Flowsheets (Taken 11/20/2023 2014)  Discharge to home or other facility with appropriate resources:   Identify barriers to discharge with patient and caregiver   Identify discharge learning needs (meds, wound care, etc)

## 2023-11-22 LAB
FUNGAL CULT/SMEAR: NORMAL
FUNGUS SMEAR: NORMAL
SPECIMEN PROCESSING: NORMAL
SPECIMEN SOURCE: NORMAL

## 2023-11-23 LAB
BACTERIA SPEC CULT: NORMAL
GRAM STN SPEC: NORMAL
SERVICE CMNT-IMP: NORMAL

## 2023-11-24 LAB
BACTERIA SPEC CULT: NORMAL
SERVICE CMNT-IMP: NORMAL

## 2023-11-27 ENCOUNTER — TELEPHONE (OUTPATIENT)
Dept: ORTHOPEDIC SURGERY | Age: 68
End: 2023-11-27

## 2023-11-27 LAB
BACTERIA SPEC CULT: NORMAL
SERVICE CMNT-IMP: NORMAL

## 2023-11-27 NOTE — TELEPHONE ENCOUNTER
Tammy navarro/ ALY[facility] parham phone# [phone] (if need) needs a post op protocol faxed to fax# [phone] and orders for staple removal

## 2023-11-27 NOTE — TELEPHONE ENCOUNTER
Spoke with patient and Tong Serrano and gave her verbal orders for staple removal at postop day 14 and will call if there is any problems.

## 2023-11-30 LAB
FUNGUS SMEAR: NORMAL
SPECIMEN SOURCE: NORMAL

## 2023-12-11 ENCOUNTER — TELEPHONE (OUTPATIENT)
Dept: ORTHOPEDIC SURGERY | Age: 68
End: 2023-12-11

## 2023-12-11 ENCOUNTER — HOSPITAL ENCOUNTER (OUTPATIENT)
Dept: ULTRASOUND IMAGING | Age: 68
Discharge: HOME | End: 2023-12-14
Attending: ORTHOPAEDIC SURGERY

## 2023-12-11 DIAGNOSIS — M79.604 PAIN AND SWELLING OF RIGHT LOWER EXTREMITY: ICD-10-CM

## 2023-12-11 DIAGNOSIS — M79.89 PAIN AND SWELLING OF RIGHT LOWER EXTREMITY: ICD-10-CM

## 2023-12-11 DIAGNOSIS — M79.604 RIGHT LEG PAIN: Primary | ICD-10-CM

## 2023-12-11 DIAGNOSIS — M79.604 RIGHT LEG PAIN: ICD-10-CM

## 2023-12-11 NOTE — TELEPHONE ENCOUNTER
ALYSE Munoz, called regarding patients symptoms he has been experiencing. The patient is still having bilateral lower extremity swelling. She is asking for a return call to discuss.

## 2024-01-04 ENCOUNTER — OFFICE VISIT (OUTPATIENT)
Dept: ORTHOPEDIC SURGERY | Age: 69
End: 2024-01-04

## 2024-01-04 DIAGNOSIS — Z96.641 STATUS POST RIGHT HIP REPLACEMENT: Primary | ICD-10-CM

## 2024-01-04 NOTE — PROGRESS NOTES
Post-op hip visit    01/04/24     Allergies:  No Known Allergies      Medications:  Current Outpatient Medications on File Prior to Visit   Medication Sig Dispense Refill    aspirin (ASPIRIN 81) 81 MG EC tablet Take 1 tablet by mouth in the morning and at bedtime 70 tablet 0    promethazine (PHENERGAN) 12.5 MG tablet Take 1 tablet by mouth 4 times daily as needed for Nausea 20 tablet 2    methocarbamol (ROBAXIN-750) 750 MG tablet Take 1 tablet by mouth 3 times daily as needed (muscle spasm or cramps) 40 tablet 1    naproxen (EC NAPROSYN) 500 MG EC tablet Take 1 tablet by mouth 2 times daily (with meals)      atorvastatin (LIPITOR) 10 MG tablet Take 1 tablet by mouth daily      finasteride (PROSCAR) 5 MG tablet Take 1 tablet by mouth daily      Mesalamine-Cleanser 4 g KIT INSERT 60 ML INTO THE RECTUM DAILY      pantoprazole (PROTONIX) 40 MG tablet Take 1 tablet by mouth daily      tadalafil (CIALIS) 20 MG tablet Take 1 tablet by mouth as needed      zaleplon (SONATA) 10 MG capsule Take 1 capsule by mouth.      Cholecalciferol 50 MCG (2000 UT) TABS Take 400 Units by mouth every morning       No current facility-administered medications on file prior to visit.        5 weeks Status Post right SUNNY revision    History: The patient returns today for post-op evaluation following SUNNY. They have minimal pain. They are ambulating without any walking aid. They have completed home therapy.   They are pleased with their results thus far. Denies any new complaints today.     Physical Exam right Hip: The patient's incision is well healed. There is minimal swelling and no increased warmth around the incision. The leg lengths are equal. There is minimal pain with ROM. Calves are soft and non-tender. Neurologic and vascular exams are intact.     X-Rays: AP pelvis and Lateral views of the right hip reveal a cementless [place] SUNNY with a Insignia stem and a ceramic on plastic articulation. There is good position of the acetabular and

## 2024-03-07 ENCOUNTER — OFFICE VISIT (OUTPATIENT)
Dept: ORTHOPEDIC SURGERY | Age: 69
End: 2024-03-07
Payer: MEDICARE

## 2024-03-07 ENCOUNTER — TELEPHONE (OUTPATIENT)
Dept: ORTHOPEDIC SURGERY | Age: 69
End: 2024-03-07

## 2024-03-07 DIAGNOSIS — Z96.641 STATUS POST RIGHT HIP REPLACEMENT: Primary | ICD-10-CM

## 2024-03-07 PROCEDURE — 1036F TOBACCO NON-USER: CPT | Performed by: ORTHOPAEDIC SURGERY

## 2024-03-07 PROCEDURE — 3017F COLORECTAL CA SCREEN DOC REV: CPT | Performed by: ORTHOPAEDIC SURGERY

## 2024-03-07 PROCEDURE — G8484 FLU IMMUNIZE NO ADMIN: HCPCS | Performed by: ORTHOPAEDIC SURGERY

## 2024-03-07 PROCEDURE — 1123F ACP DISCUSS/DSCN MKR DOCD: CPT | Performed by: ORTHOPAEDIC SURGERY

## 2024-03-07 PROCEDURE — G8428 CUR MEDS NOT DOCUMENT: HCPCS | Performed by: ORTHOPAEDIC SURGERY

## 2024-03-07 PROCEDURE — G8419 CALC BMI OUT NRM PARAM NOF/U: HCPCS | Performed by: ORTHOPAEDIC SURGERY

## 2024-03-07 PROCEDURE — 99213 OFFICE O/P EST LOW 20 MIN: CPT | Performed by: ORTHOPAEDIC SURGERY

## 2024-03-07 RX ORDER — SULFAMETHOXAZOLE AND TRIMETHOPRIM 800; 160 MG/1; MG/1
1 TABLET ORAL 2 TIMES DAILY
Qty: 20 TABLET | Refills: 0 | Status: SHIPPED | OUTPATIENT
Start: 2024-03-07 | End: 2024-03-17

## 2024-03-07 NOTE — TELEPHONE ENCOUNTER
Tammy navarro/[facility] physical therapy calling to see if anything was noticed regarding the health of his hip abductors during surgery because he remains sore. Please call her [phone]

## 2024-03-07 NOTE — TELEPHONE ENCOUNTER
Spoke with elizabeth and the tissues seemed normal and that we seen the patient today and Dr Peters did speak with the patient

## 2024-03-07 NOTE — PROGRESS NOTES
intact.     X-Rays: no new imaging today    Disposition: The patient is doing well following right SUNNY revision.  I will place him on 10 days of oral antibiotics.  He is to continue observing this area over the inferior portion of his incision.  They will continue activity as tolerated. They were advised about fall precautions and dental prophylaxis. They will follow up as scheduled  or sooner if needed.

## 2024-08-23 ENCOUNTER — TELEPHONE (OUTPATIENT)
Dept: ORTHOPEDIC SURGERY | Age: 69
End: 2024-08-23

## 2024-08-27 ENCOUNTER — OFFICE VISIT (OUTPATIENT)
Dept: ORTHOPEDIC SURGERY | Age: 69
End: 2024-08-27
Payer: MEDICARE

## 2024-08-27 ENCOUNTER — HOSPITAL ENCOUNTER (OUTPATIENT)
Dept: ULTRASOUND IMAGING | Age: 69
Discharge: HOME | End: 2024-08-30
Attending: ORTHOPAEDIC SURGERY
Payer: MEDICARE

## 2024-08-27 DIAGNOSIS — M25.462 PAIN AND SWELLING OF KNEE, LEFT: ICD-10-CM

## 2024-08-27 DIAGNOSIS — M25.562 LEFT KNEE PAIN, UNSPECIFIED CHRONICITY: Primary | ICD-10-CM

## 2024-08-27 DIAGNOSIS — M25.562 LEFT KNEE PAIN, UNSPECIFIED CHRONICITY: ICD-10-CM

## 2024-08-27 DIAGNOSIS — S83.242A ACUTE MEDIAL MENISCUS TEAR OF LEFT KNEE, INITIAL ENCOUNTER: ICD-10-CM

## 2024-08-27 DIAGNOSIS — M25.562 PAIN AND SWELLING OF KNEE, LEFT: ICD-10-CM

## 2024-08-27 PROCEDURE — 99214 OFFICE O/P EST MOD 30 MIN: CPT | Performed by: ORTHOPAEDIC SURGERY

## 2024-08-27 PROCEDURE — 93971 EXTREMITY STUDY: CPT

## 2024-08-27 PROCEDURE — 1123F ACP DISCUSS/DSCN MKR DOCD: CPT | Performed by: ORTHOPAEDIC SURGERY

## 2024-08-27 PROCEDURE — G8428 CUR MEDS NOT DOCUMENT: HCPCS | Performed by: ORTHOPAEDIC SURGERY

## 2024-08-27 PROCEDURE — 1036F TOBACCO NON-USER: CPT | Performed by: ORTHOPAEDIC SURGERY

## 2024-08-27 PROCEDURE — 3017F COLORECTAL CA SCREEN DOC REV: CPT | Performed by: ORTHOPAEDIC SURGERY

## 2024-08-27 PROCEDURE — 20610 DRAIN/INJ JOINT/BURSA W/O US: CPT | Performed by: ORTHOPAEDIC SURGERY

## 2024-08-27 PROCEDURE — G8419 CALC BMI OUT NRM PARAM NOF/U: HCPCS | Performed by: ORTHOPAEDIC SURGERY

## 2024-08-27 RX ORDER — METHYLPREDNISOLONE ACETATE 40 MG/ML
80 INJECTION, SUSPENSION INTRA-ARTICULAR; INTRALESIONAL; INTRAMUSCULAR; SOFT TISSUE ONCE
Status: COMPLETED | OUTPATIENT
Start: 2024-08-27 | End: 2024-08-27

## 2024-08-27 RX ADMIN — METHYLPREDNISOLONE ACETATE 80 MG: 40 INJECTION, SUSPENSION INTRA-ARTICULAR; INTRALESIONAL; INTRAMUSCULAR; SOFT TISSUE at 14:45

## 2024-08-27 NOTE — PROGRESS NOTES
Name: Remy Alvarez  YOB: 1955  Gender: male  MRN: [id number]    CC:   Chief Complaint   Patient presents with    Knee Pain     Left knee pain         HPI: Remy Alvarez is a 68 y.o. male with a PMHx of GERD and HLD here for evaluation of left knee pain.  The pain has been present for 2 weeks after feeling a pop after playing 18 holes of golf.   The pain is primarily on the Medial and posterior side.   he describes the pain as aching and and often feels unstable  The pain is worse with going up and/or down stairs and walking  The pain does not radiate down the leg.  Treatment so far has been activity modification and celebrex with little relief.      No Known Allergies      Review of Systems:  As per HPI.  Pertinent positives and negatives are addressed with the patient, particularly those related to musculoskeletal concerns.   Non-orthopaedic concerns were referred back to the primary care physician.    PHYSICAL EXAMINATION:   The patient appears their stated age and they are in no distress.  There were no vitals taken for this visit.      The lower extremities are as described below.  Circulation is normal with palpable pedal pulses bilaterally and no edema.  There is no lymph adenopathy in the popliteal or malleolar region.  The skin is without stasis disease distally bilaterally.  Sensation is intact to light touch bilaterally.  There is moderate tenderness to palpation over the medial joint line of the left knee(s)  The gait is noted to be normal  Range of motion is 0-120 degrees on the right and 0-120 degrees on the left.  left knee: There is 2mm of anterior/posterior translation and 2mm of medial/lateral instability bilaterally.  There is 2 degrees of varus alignment in the left knee.  Limb lengths are equal.  Quadriceps strength is excellent.  Their judgment and insight are normal.  They are oriented to time, place and person.  Their memory is good and the mood and affect

## 2024-09-05 ENCOUNTER — OFFICE VISIT (OUTPATIENT)
Dept: ORTHOPEDIC SURGERY | Age: 69
End: 2024-09-05
Payer: MEDICARE

## 2024-09-05 DIAGNOSIS — S83.242A ACUTE MEDIAL MENISCUS TEAR OF LEFT KNEE, INITIAL ENCOUNTER: ICD-10-CM

## 2024-09-05 DIAGNOSIS — M25.562 ACUTE PAIN OF LEFT KNEE: Primary | ICD-10-CM

## 2024-09-05 PROCEDURE — 99204 OFFICE O/P NEW MOD 45 MIN: CPT | Performed by: ORTHOPAEDIC SURGERY

## 2024-09-05 PROCEDURE — G8428 CUR MEDS NOT DOCUMENT: HCPCS | Performed by: ORTHOPAEDIC SURGERY

## 2024-09-05 PROCEDURE — 3017F COLORECTAL CA SCREEN DOC REV: CPT | Performed by: ORTHOPAEDIC SURGERY

## 2024-09-05 PROCEDURE — G8419 CALC BMI OUT NRM PARAM NOF/U: HCPCS | Performed by: ORTHOPAEDIC SURGERY

## 2024-09-05 PROCEDURE — 1036F TOBACCO NON-USER: CPT | Performed by: ORTHOPAEDIC SURGERY

## 2024-09-05 PROCEDURE — 1123F ACP DISCUSS/DSCN MKR DOCD: CPT | Performed by: ORTHOPAEDIC SURGERY

## 2024-09-05 NOTE — PROGRESS NOTES
Name: Remy Alvarez  YOB: 1955  Gender: male  MRN: [id number]      CC: Knee Pain (L)       HPI: Remy Alvarez is a 68 y.o. male who presents with Knee Pain (L)  .  He is a new patient of mine referred by Dr. Peters.  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  This past Tuesday he felt another pop [place].  Most the pain is located in the posterior aspect of his knee and medial joint line.  He does have a history of a hamstring tear and a deep vein thrombosis.  Dr. Peters ordered an ultrasound that was negative for DVTs.  He is icing and elevating for his pain.  His swelling has decreased.        Physical Examination:  General: no acute distress  Lungs: breathing easily  CV: regular rhythm by pulse  Left Knee: Tenderness palpation of the medial joint line pain at the extremes of motion over the medial joint line positive bounce home.  He does have some tenderness palpation posteriorly around the hamstrings.  He does have some recreation of symptoms with Serjio's.      Imaging:   I reviewed his plain radiographs which show just minimal degenerative changes.  I also reviewed an MRI scan which is some heterogeneous signal around the posterior horn the medial meniscus in the meniscocapsular junction with a potential loose body versus an osteophyte of the posterior tibial plateau adjacent to the root attachment.  All imaging interpreted by myself Tim Loco MD independent of radiology review        Assessment:     ICD-10-CM    1. Acute pain of left knee  M25.562       2. Acute medial meniscus tear of left knee, initial encounter  S83.242A           Plan:   We reviewed the images together he has signs and symptoms of medial meniscus tear with the MRI is inconclusive he may have a root avulsion which is subtle versus a loose body in his knee.  I think he has a lot of hamstring

## 2024-09-06 DIAGNOSIS — M25.562 LEFT KNEE PAIN, UNSPECIFIED CHRONICITY: Primary | ICD-10-CM

## 2025-02-04 ENCOUNTER — OFFICE VISIT (OUTPATIENT)
Dept: ORTHOPEDIC SURGERY | Age: 70
End: 2025-02-04

## 2025-02-04 DIAGNOSIS — M16.12 PRIMARY OSTEOARTHRITIS OF LEFT HIP: ICD-10-CM

## 2025-02-04 DIAGNOSIS — M25.552 LEFT HIP PAIN: Primary | ICD-10-CM

## 2025-02-04 NOTE — PROGRESS NOTES
Name: Remy Alvarez  YOB: 1955  Gender: male  MRN: [id number]    CC:   Chief Complaint   Patient presents with    Hip Pain     Left hip pain         HPI: Remy Alvarez is a 69 y.o. male with a  has a past medical history of Arthritis, Benign essential hypertension, Collagenous colitis, DDD (degenerative disc disease), lumbar, Diabetes mellitus (HCC), DVT (deep venous thrombosis) ([facility]), GERD (gastroesophageal reflux disease), Nuclear sclerotic cataract, bilateral, PVC (premature ventricular contraction), and Reflux gastritis. here for evaluation of left hip pain  There was not an acute injury  Regarding the hip pain, it has been present for 2 months and is becoming worse. The pain is primarily lateral, over greater trochanter and anterior into the groin  he describes the pain as aching and stiffness in the morning mostly  The pain is worse with going up and/or down stairs, rising after sitting, crossing their legs, and lifting their leg  The pain does not radiate down the leg.  he denies numbness and tingling down the leg.   Treatment so far has been activity modification, Tylenol, and celebrex with little relief.        Review of Systems  As per HPI.  Pertinent positives and negatives are addressed with the patient, particularly those related to musculoskeletal concerns.  Non-orthopaedic concerns were referred back to the primary care physician.      No Known Allergies                 PHYSICAL EXAMINATION:   The patient is alert and oriented, in no distress.  There were no vitals filed for this visit.       The gait is noted to be with a slight trendelenburg and antalgia  There is mild tenderness to palpation along the spinous processes and paraspinal musculature.   There mild tenderness to palpation over the left greater trochanter  left hip range of motion is 0-90 degrees of flexion and 20 degrees on abduction and adduction.  There is 15 degrees internal rotation and 25 degrees of

## 2025-02-06 ENCOUNTER — OFFICE VISIT (OUTPATIENT)
Dept: ORTHOPEDIC SURGERY | Age: 70
End: 2025-02-06

## 2025-02-06 DIAGNOSIS — M16.12 PRIMARY OSTEOARTHRITIS OF LEFT HIP: Primary | ICD-10-CM

## 2025-02-06 RX ORDER — METHYLPREDNISOLONE ACETATE 40 MG/ML
80 INJECTION, SUSPENSION INTRA-ARTICULAR; INTRALESIONAL; INTRAMUSCULAR; SOFT TISSUE ONCE
Status: COMPLETED | OUTPATIENT
Start: 2025-02-06 | End: 2025-02-06

## 2025-02-06 RX ADMIN — METHYLPREDNISOLONE ACETATE 80 MG: 40 INJECTION, SUSPENSION INTRA-ARTICULAR; INTRALESIONAL; INTRAMUSCULAR; SOFT TISSUE at 13:24

## 2025-02-06 NOTE — PROGRESS NOTES
Name: Remy Alvarez  YOB: 1955  Gender: male  MRN: [id number]      Current Outpatient Medications:     aspirin (ASPIRIN 81) 81 MG EC tablet, Take 1 tablet by mouth in the morning and at bedtime, Disp: 70 tablet, Rfl: 0    promethazine (PHENERGAN) 12.5 MG tablet, Take 1 tablet by mouth 4 times daily as needed for Nausea, Disp: 20 tablet, Rfl: 2    methocarbamol (ROBAXIN-750) 750 MG tablet, Take 1 tablet by mouth 3 times daily as needed (muscle spasm or cramps), Disp: 40 tablet, Rfl: 1    naproxen (EC NAPROSYN) 500 MG EC tablet, Take 1 tablet by mouth 2 times daily (with meals), Disp: , Rfl:     atorvastatin (LIPITOR) 10 MG tablet, Take 1 tablet by mouth daily, Disp: , Rfl:     finasteride (PROSCAR) 5 MG tablet, Take 1 tablet by mouth daily, Disp: , Rfl:     Mesalamine-Cleanser 4 g KIT, INSERT 60 ML INTO THE RECTUM DAILY, Disp: , Rfl:     pantoprazole (PROTONIX) 40 MG tablet, Take 1 tablet by mouth daily, Disp: , Rfl:     tadalafil (CIALIS) 20 MG tablet, Take 1 tablet by mouth as needed, Disp: , Rfl:     zaleplon (SONATA) 10 MG capsule, Take 1 capsule by mouth., Disp: , Rfl:     Cholecalciferol 50 MCG (2000 UT) TABS, Take 400 Units by mouth every morning, Disp: , Rfl:   No Known Allergies    CC:   Chief Complaint   Patient presents with    Injections     Lt hip injection       PROCEDURE: Intra-articular hip injection    The patient is here for injection of the left hip. The groin region was prepped with alcohol. Lateral to the neurovascular bundle, a 22 gauge spinal needle was directed into the right hip joint under x-ray guidance. The hip was then injected with 40 mg of methylprednisolone and 2 mL of 0.5% Marcaine.    The patient tolerated this procedure without difficulty.   The patient will keep their regularly scheduled appointment.

## 2025-03-05 ENCOUNTER — OFFICE VISIT (OUTPATIENT)
Dept: ORTHOPEDIC SURGERY | Age: 70
End: 2025-03-05

## 2025-03-05 DIAGNOSIS — M16.12 PRIMARY OSTEOARTHRITIS OF LEFT HIP: ICD-10-CM

## 2025-03-05 DIAGNOSIS — M67.952 TENDINOPATHY OF LEFT GLUTEUS MEDIUS: Primary | ICD-10-CM

## 2025-03-05 RX ORDER — TRIAMCINOLONE ACETONIDE 40 MG/ML
40 INJECTION, SUSPENSION INTRA-ARTICULAR; INTRAMUSCULAR ONCE
Status: COMPLETED | OUTPATIENT
Start: 2025-03-05 | End: 2025-03-05

## 2025-03-05 RX ADMIN — TRIAMCINOLONE ACETONIDE 40 MG: 40 INJECTION, SUSPENSION INTRA-ARTICULAR; INTRAMUSCULAR at 10:16

## 2025-03-05 NOTE — PROGRESS NOTES
LEFT Glute Medius Peritendon Injection - Ultrasound-guided    An injection of corticosteroid was discussed today and is indicated for patient’s hip pain today. All risks and benefits were discussed and patient wishes to proceed. Prior to proceeding forward with the injections to the LEFT sub-gluteus yury bursa, risks and benefits were discussed.  Risks including infection, bleeding, nerve damage, and pain at the site of injection were discussed at length with the patient. Benefits including pain relief were also discussed with the patient. Patient verbalizes understanding of these and agrees to procedure.      The patient was positioned lateral decubitus. An inferior to superior approach was utilized for this injection procedure. The site of the injection was then cleansed betadine solution. Using sterile technique, the ultrasound was used to identify the target anatomy. Following this a vapo coolant spray was utilized to provide local skin anesthesia. The needle was inserted and its track followed with ultrasound guidance. A solution of 40mg/ml Kenalog, 1cc and 4cc 1% lidocaine was delivered through a 22 gauge, 2.5\" spinal needle into the glute medius peritendon region. The site was again cleansed with an alcohol swab. The patient tolerated this procedure well with no adverse events. There was some notable pain relief reported by the patient prior to leaving the office today. The patient was counseled not to submerge the site for 24 hours, not to perform strenuous activity for the next five days, and if notes any signs or symptoms consistent with joint infection or allergic reaction to go to a local emergency room. The patient was observed for 15 minutes postprocedure and was allowed to be discharged from clinic in their usual state of health.      Ultrasound was used for this procedure for accurate identification of the target and placement of the injection. Imaging was saved to PACS system.     F/u 3 mo

## 2025-06-05 ENCOUNTER — OFFICE VISIT (OUTPATIENT)
Dept: ORTHOPEDIC SURGERY | Age: 70
End: 2025-06-05

## 2025-06-05 DIAGNOSIS — M16.12 PRIMARY OSTEOARTHRITIS OF LEFT HIP: Primary | ICD-10-CM

## 2025-06-05 RX ORDER — TRIAMCINOLONE ACETONIDE 40 MG/ML
40 INJECTION, SUSPENSION INTRA-ARTICULAR; INTRAMUSCULAR ONCE
Status: COMPLETED | OUTPATIENT
Start: 2025-06-05 | End: 2025-06-05

## 2025-06-05 RX ADMIN — TRIAMCINOLONE ACETONIDE 40 MG: 40 INJECTION, SUSPENSION INTRA-ARTICULAR; INTRAMUSCULAR at 08:24

## 2025-06-05 NOTE — PROGRESS NOTES
LEFT Glute Medius Peritendon Injection - Ultrasound-guided    An injection of corticosteroid was discussed today and is indicated for patient’s hip pain today. All risks and benefits were discussed and patient wishes to proceed. Prior to proceeding forward with the injections to the LEFT sub-gluteus yury bursa, risks and benefits were discussed.  Risks including infection, bleeding, nerve damage, and pain at the site of injection were discussed at length with the patient. Benefits including pain relief were also discussed with the patient. Patient verbalizes understanding of these and agrees to procedure.      The patient was positioned lateral decubitus. An inferior to superior approach was utilized for this injection procedure. The site of the injection was then cleansed betadine solution. Using sterile technique, the ultrasound was used to identify the target anatomy. Following this a vapo coolant spray was utilized to provide local skin anesthesia. The needle was inserted and its track followed with ultrasound guidance. A solution of 40mg/ml Kenalog, 1cc and 4cc 1% lidocaine was delivered through a 22 gauge, 2.5\" spinal needle into the glute medius peritendon region. The site was again cleansed with an alcohol swab. The patient tolerated this procedure well with no adverse events. There was some notable pain relief reported by the patient prior to leaving the office today. The patient was counseled not to submerge the site for 24 hours, not to perform strenuous activity for the next five days, and if notes any signs or symptoms consistent with joint infection or allergic reaction to go to a local emergency room. The patient was observed for 15 minutes postprocedure and was allowed to be discharged from clinic in their usual state of health.      Ultrasound was used for this procedure for accurate identification of the target and placement of the injection. Imaging was saved to PACS system.     F/u

## 2025-08-28 DIAGNOSIS — M16.12 PRIMARY OSTEOARTHRITIS OF LEFT HIP: Primary | ICD-10-CM

## (undated) DEVICE — SOLUTION IRRIG 1000ML 0.9% SOD CHL USP POUR PLAS BTL

## (undated) DEVICE — DRAPE,TOP,102X53,STERILE: Brand: MEDLINE

## (undated) DEVICE — 450 ML BOTTLE OF 0.05% CHLORHEXIDINE GLUCONATE IN 99.95% STERILE WATER FOR IRRIGATION, USP AND APPLICATOR.: Brand: IRRISEPT ANTIMICROBIAL WOUND LAVAGE

## (undated) DEVICE — BIPOLAR SEALER 23-112-1 AQM 6.0: Brand: AQUAMANTYS ®

## (undated) DEVICE — GLOVE SURG SZ 7 L11.33IN FNGR THK9.8MIL STRW LTX POLYMER

## (undated) DEVICE — GLOVE ORANGE PI 8   MSG9080

## (undated) DEVICE — SUTURE ABSRB X-1 REV CUT 1/2 CIR 22MM UD BRAID 27IN SZ 3-0 J458H

## (undated) DEVICE — SUTURE ABS MF 2-0 CT1 27IN STRATAFIX PDS+ SXPP1B412

## (undated) DEVICE — CANISTER, RIGID, 2000CC: Brand: MEDLINE INDUSTRIES, INC.

## (undated) DEVICE — BAG WND LAV 1L CLR ETH ACET ACID SOD ACETT BENZALKONIUM CHL

## (undated) DEVICE — STERILE PVP: Brand: MEDLINE INDUSTRIES, INC.

## (undated) DEVICE — DRESSING HYDROFIBER AQUACEL AG ADVANTAGE 3.5X10 IN

## (undated) DEVICE — YANKAUER,FLEXIBLE HANDLE,REGLR CAPACITY: Brand: MEDLINE INDUSTRIES, INC.

## (undated) DEVICE — COVER,MAYO STAND,XL,STERILE: Brand: MEDLINE

## (undated) DEVICE — SUTURE ETHBND EXCEL SZ 5 L30IN NONABSORBABLE GRN L40MM V-37 MB66G

## (undated) DEVICE — GLOVE SURG SZ 8 L12IN FNGR THK79MIL GRN LTX FREE

## (undated) DEVICE — TOTAL HIP DR JENNINGS: Brand: MEDLINE INDUSTRIES, INC.

## (undated) DEVICE — SOLUTION IRRIG 1000ML STRL H2O USP PLAS POUR BTL

## (undated) DEVICE — SUTURE ABS ANTIBACT 1-0 CTX 24IN STRATAFIX PDS+ SXPP1A445

## (undated) DEVICE — GLOVE SURG SZ 65 THK91MIL LTX FREE SYN POLYISOPRENE